# Patient Record
Sex: FEMALE | Race: WHITE | NOT HISPANIC OR LATINO | Employment: FULL TIME | URBAN - METROPOLITAN AREA
[De-identification: names, ages, dates, MRNs, and addresses within clinical notes are randomized per-mention and may not be internally consistent; named-entity substitution may affect disease eponyms.]

---

## 2021-01-11 ENCOUNTER — TRANSFERRED RECORDS (OUTPATIENT)
Dept: HEALTH INFORMATION MANAGEMENT | Facility: CLINIC | Age: 31
End: 2021-01-11

## 2021-01-12 ENCOUNTER — TRANSFERRED RECORDS (OUTPATIENT)
Dept: HEALTH INFORMATION MANAGEMENT | Facility: CLINIC | Age: 31
End: 2021-01-12

## 2021-01-14 ENCOUNTER — TRANSFERRED RECORDS (OUTPATIENT)
Dept: HEALTH INFORMATION MANAGEMENT | Facility: CLINIC | Age: 31
End: 2021-01-14

## 2021-01-26 ENCOUNTER — MEDICAL CORRESPONDENCE (OUTPATIENT)
Dept: HEALTH INFORMATION MANAGEMENT | Facility: CLINIC | Age: 31
End: 2021-01-26

## 2021-01-27 ENCOUNTER — TRANSCRIBE ORDERS (OUTPATIENT)
Dept: OTHER | Age: 31
End: 2021-01-27

## 2021-01-27 DIAGNOSIS — G89.29 BILATERAL CHRONIC KNEE PAIN: Primary | ICD-10-CM

## 2021-01-27 DIAGNOSIS — M25.561 BILATERAL CHRONIC KNEE PAIN: Primary | ICD-10-CM

## 2021-01-27 DIAGNOSIS — M25.562 BILATERAL CHRONIC KNEE PAIN: Primary | ICD-10-CM

## 2021-03-26 DIAGNOSIS — M25.561 PAIN IN BOTH KNEES, UNSPECIFIED CHRONICITY: Primary | ICD-10-CM

## 2021-03-26 DIAGNOSIS — M25.562 PAIN IN BOTH KNEES, UNSPECIFIED CHRONICITY: Primary | ICD-10-CM

## 2021-03-30 ENCOUNTER — PRE VISIT (OUTPATIENT)
Dept: ORTHOPEDICS | Facility: CLINIC | Age: 31
End: 2021-03-30

## 2021-03-30 ENCOUNTER — ANCILLARY PROCEDURE (OUTPATIENT)
Dept: GENERAL RADIOLOGY | Facility: CLINIC | Age: 31
End: 2021-03-30
Attending: ORTHOPAEDIC SURGERY
Payer: OTHER GOVERNMENT

## 2021-03-30 ENCOUNTER — ANCILLARY PROCEDURE (OUTPATIENT)
Dept: CT IMAGING | Facility: CLINIC | Age: 31
End: 2021-03-30
Attending: ORTHOPAEDIC SURGERY
Payer: OTHER GOVERNMENT

## 2021-03-30 ENCOUNTER — THERAPY VISIT (OUTPATIENT)
Dept: PHYSICAL THERAPY | Facility: CLINIC | Age: 31
End: 2021-03-30
Payer: OTHER GOVERNMENT

## 2021-03-30 ENCOUNTER — OFFICE VISIT (OUTPATIENT)
Dept: ORTHOPEDICS | Facility: CLINIC | Age: 31
End: 2021-03-30
Payer: OTHER GOVERNMENT

## 2021-03-30 VITALS — BODY MASS INDEX: 39.68 KG/M2 | WEIGHT: 293 LBS | HEIGHT: 72 IN

## 2021-03-30 DIAGNOSIS — M25.562 BILATERAL CHRONIC KNEE PAIN: ICD-10-CM

## 2021-03-30 DIAGNOSIS — M25.562 PAIN IN BOTH KNEES, UNSPECIFIED CHRONICITY: ICD-10-CM

## 2021-03-30 DIAGNOSIS — M25.561 PAIN IN BOTH KNEES, UNSPECIFIED CHRONICITY: ICD-10-CM

## 2021-03-30 DIAGNOSIS — M25.561 BILATERAL CHRONIC KNEE PAIN: ICD-10-CM

## 2021-03-30 DIAGNOSIS — M25.562 CHRONIC PAIN OF BOTH KNEES: ICD-10-CM

## 2021-03-30 DIAGNOSIS — G89.29 BILATERAL CHRONIC KNEE PAIN: ICD-10-CM

## 2021-03-30 DIAGNOSIS — G89.29 CHRONIC PAIN OF BOTH KNEES: ICD-10-CM

## 2021-03-30 DIAGNOSIS — M25.561 CHRONIC PAIN OF BOTH KNEES: ICD-10-CM

## 2021-03-30 PROCEDURE — 97530 THERAPEUTIC ACTIVITIES: CPT | Mod: GP | Performed by: PHYSICAL THERAPIST

## 2021-03-30 PROCEDURE — 97110 THERAPEUTIC EXERCISES: CPT | Mod: GP | Performed by: PHYSICAL THERAPIST

## 2021-03-30 PROCEDURE — 99203 OFFICE O/P NEW LOW 30 MIN: CPT | Mod: GC | Performed by: ORTHOPAEDIC SURGERY

## 2021-03-30 PROCEDURE — 73560 X-RAY EXAM OF KNEE 1 OR 2: CPT | Mod: XU | Performed by: RADIOLOGY

## 2021-03-30 PROCEDURE — 73700 CT LOWER EXTREMITY W/O DYE: CPT | Mod: RT | Performed by: RADIOLOGY

## 2021-03-30 PROCEDURE — 97161 PT EVAL LOW COMPLEX 20 MIN: CPT | Mod: GP | Performed by: PHYSICAL THERAPIST

## 2021-03-30 PROCEDURE — 77073 BONE LENGTH STUDIES: CPT | Performed by: RADIOLOGY

## 2021-03-30 RX ORDER — LEVOTHYROXINE SODIUM 50 UG/1
50 TABLET ORAL DAILY
COMMUNITY
Start: 2021-03-09 | End: 2022-07-13

## 2021-03-30 ASSESSMENT — KOOS JR: HOW SEVERE IS YOUR KNEE STIFFNESS AFTER FIRST WAKING IN MORNING: MODERATE

## 2021-03-30 ASSESSMENT — MIFFLIN-ST. JEOR: SCORE: 2163.16

## 2021-03-30 NOTE — NURSING NOTE
"Reason For Visit:   Chief Complaint   Patient presents with     Consult     [facility] chronic knee pain/Dr. Tavares       Primary MD: Dr. Tavares  Ref. MD: Dr. Tavares    ?  No  Occupation .  Currently working? Yes.  Work status?  Full time.    Date of injury: No  Type of injury: No.    Date of surgery & Type:  2000's and 2010   Multiple scopes on both knees and a  Right patella derotation     Smoker: No  Request smoking cessation information: No    Ht 1.825 m (5' 11.85\")   Wt 134.9 kg (297 lb 4.8 oz)   BMI 40.49 kg/m      Pain Assessment  Patient Currently in Pain: Yes  0-10 Pain Scale: 5  Primary Pain Location: Knee(bilateral)       Judith LeonardaoABRAMN  "

## 2021-03-30 NOTE — PROGRESS NOTES
"    Department of Orthopedic Surgery  Felecia Edouard MD        PATIENT NAME: Suzy Lobato   MRN: [id number]  AGE: 31 year old  BMI: Body mass index is 40.49 kg/m .  REFERRING PHYSICIAN: Dean Tavares      CHIEF COMPLAINT: Consult ([facility] chronic knee pain/Dr. Tavares)      HISTORY OF PRESENT ILLNESS:  Suzy Lobato is a 31 year old female who presents with bilateral knee pain, right worse than left.  She reports that this pain has been ongoing for almost her entire life.  Her left knee began giving her problems in middle school and her first left knee arthroscopy was performed in the year 2000.  She underwent a subsequent arthroscopy in the mid 2000.  She states that each of the surgeries helped somewhat but not significantly and were not lasting.  Her right knee has undergone arthroscopy as well as a right lateral retinacular release which provided short-term relief but is now worsened.  This last surgery was in 2010.  She has undergone multiple courses of physical therapy in the past with limited benefit.  She did not take any medications for her pain.  She has not tried any braces.  She reports the majority of her pain in the anterior and anteromedial aspects of her knee, both on the left and right.  She has not found anything to significantly improve her pain.  Recently, her right knee has been swelling up more on her and feels \"tight\".    Pertinent negatives:  Patient has no history of DVT or PE. Discussed risk factors.      ALLERGIES: Patient has no known allergies.    MEDICATIONS:     Current Outpatient Medications:      levothyroxine (SYNTHROID/LEVOTHROID) 50 MCG tablet, Take 50 mcg by mouth daily, Disp: , Rfl:   She just began this above medication approximately 2 weeks ago.  She was previously on the medication long-term, but when she immigrated to the [place] she was told she no longer needed it.  Upon stopping the medication, she gained almost 100 pounds.    MEDICAL HISTORY:  She is " "currently undergoing work-up for PCOS.  In addition, she is working with the dietitian to work on her weight.  She has tried multiple diets in the past including ketogenic, Atkins, and calorie counting.  None of these have seemed to help.    She has irregular menses.  currently undergoing a hormonal work-up.   Is back on thyroid medication for the last 2 wks to see if this changes her weight and other factors.    SURGICAL HISTORY:   Multiple prior knee surgeries including 2x left knee arthroscopy, right knee arthroscopy, and lateral retinacular lengthening and \"patellar derotation\"    SOCIAL HISTORY:  Patient lives in [place] with her  and her 3-1/2-year-old son.  She works as an  (office work).  Her  is in the  (as well as her father) and she has lived in many places.  She is [place] borne and moved to the  with her  5 years ago.    Social History     Tobacco Use     Smoking status: Not on file   Substance Use Topics     Alcohol use: Not on file     PHYSICAL EXAMINATION:  On physical examination the patient appears the stated age, is in no acute distress, affect is appropriate, and breathing is non-labored.  BMI: Body mass index is 40.49 kg/m .    Gait: patient walks with a normal gait.     LLE:  No deformity, skin intact.  Prior surgical incision:  well-healed   Overall limb alignment is: Neutral  Effusion or swelling of the knee: None  Tenderness to palpation: Pain with patellar compression.  Pain to medial and lateral joint line.  ROM: 0 to 130  ROM hip: 0 internal rotation, 60 external rotation.  Pain with knee ROM: Yes, anteromedial and medial joint line  Crepitance with knee ROM: Yes, repeatable crepitus near terminal extension  Extensor lag: None  MCL stability: Stable  Lateral Stability: Stable  Lachman: 1A  Posterior stability: stable  Patellar translation: 1 quadrant medial, 1 quadrants lateral   Apprehension: None  Medial patella tilt: None  J-sign: " None     Motor intact distally TA/GSC/EHL/FHL with 5/5 strength. SILT sp/dp/tibial/saph/sural nerves. DP/PT pulses palpable, 2+, toes warm and well perfused.     RLE:     No deformity, skin intact.  Prior surgical incision: Well-healed  Overall limb alignment is: Neutral  Effusion or swelling of the knee: Mild effusion  Tenderness to palpation: Tender to palpation in the anteromedial knee, medial and lateral joint lines, patellar compression  Knee ROM: 5 to 130  Hip ROM: 10 degrees of internal rotation and 70 degrees of external rotation.  Pain with knee ROM: Yes, prominently anterior and anteromedial  Crepitance with knee ROM: No repeatable crepitus  Extensor lag: None  MCL stability: Stable  Lateral Stability: Stable  Lachman: 1A  Posterior stability: stable  Patellar translation: 1 quadrant medial, 2 quadrants lateral  Apprehension: None  Medial patella tilt: None  J-sign: None     Motor intact distally TA/GSC/EHL/FHL with 5/5 strength. SILT sp/dp/tibial/saph/sural nerves. DP/PT pulses palpable, 2+, toes warm and well perfused.      IMAGING:   Knee X-Rays were obtained today and reviewed.     The 20 degree axial views demonstrate slight joint space narrowing on the left compared to the right.  The patellae are sitting within the trochlear groove.    Long-leg standing films were obtained which shows slight genu valgum on the left and neutral limb alignment on the right.    Previously obtained MRI in January 2021 of the bilateral knees demonstrates mainly patellofemoral chondral loss with a moderate effusion on the right knee.  There is no evidence of meniscal tearing, ligamentous changes, or significant bony edema.  No full-thickness cartilage defects on the tibiofemoral joint.    ASSESSMENT/PLAN: Suzy Lobato is a 31 year old female with a history of multiple bilateral knee surgeries who presents to clinic today with bilateral knee pain, right worse than left.     We discussed today that there are a multitude  of factors that could be contributing to the patient's knee pain.  She has chondral wear in her patellofemoral joint and localizes most of her pain to the patellofemoral joint.  However, she has pain in her joint line and tibial femoral symptoms as well.  We discussed that her hip rotation is markedly abnormal with no internal rotation on the left hip and only 10 degrees of rotation on the right hip.  We recommended a CT of her bilateral lower extremities to evaluate for rotational alignment of the femurs and tibias.  We anticipate that rotational malalignment of her limbs could be contributing to her symptoms and placing her at high risk of progressive osteoarthritis.    We also discussed her weight today and how this contributes to her pain.  She has struggled with her weight [place] and not getting her medication for levothyroxine approved.  She gained over 100 pounds with the loss of this medication and change in lifestyle.  In addition, she had a child approximately 3-1/2 years ago and is much heavier now than she was prior to her pregnancy.  She has tried multiple diets as well as activity modifications and has had little success.  She has worked with a dietitian and would be interested in bariatric surgery, however this was previously denied as she does not have a high enough BMI for bariatric surgery consideration with her current insurance.  She is also undergoing work-up for PCOS with her OB/GYN, and is hopeful that if she can get this under control this may be of assistance as well.    Plan:  -CT scan of bilateral lower extremities to evaluate rotational malalignment  -Physical therapy for strengthening and range of motion of bilateral lower extremities.  This can be completed closer to home in [place].  -Follow-up in 1 week with a virtual visit/telephone call for results of CT.  If the CT shows significant rotational abnormalities, would consider follow-up with one of  our hip surgeons or lower extremity deformity surgeons.    The patient was seen and discussed with Dr. Edouard.    RT:30 min, CT: 25 min    Freddy Jack MD  Orthopaedic Surgery PGY-4    I have personally examined this patient and have reviewed the clinical presentation and progress note with the resident.  I agree with the treatment plan as outlined.  The plan was formulated with the resident on the day of the resident dictation.    Felecia Edouard MD    Addendum:   Right femoral RETROVERSION is 5 degrees.  Left femoral RETROVERSION is 11 degrees.     Tibial Torsion:  Tibial torsion on the right is 44 degrees.  Tibial torsion on the left is 47 degrees.     Tibial Tuberosity to Trochlear groove distance:  Right:1mm  Left: 7 mm.    The results of the CT scan were discussed with the patient by phone.  I am recommending that she consider proceeding with a tibial derotation osteotomy on her right side which is her most affected side.  I believe that her particular constellation of symptoms are interesting in that because her hips are so retroverted and so stiff to range of motion, she would have a particularly hard time internally rotating her foot to get her toes pointing straight forward.  This is why she has such a hard time with walking as she does not like the feeling of walking with her feet externally rotated into regards to balance and leg pain but she literally has a hard time pointing her toes straightforward.    She understands that I do not perform the surgery at this point time and have recommended Dr. Madrigal as he has more experience with tibia fractures rodding and lower extremity conditions.  Because she lives in [place] and travels far, she will expect a phone call discussion for virtual visit with Dr. Madrigal.    All questions were answered    Felecia Edouard MD  Professor Orthopedic Surgery  [facility]

## 2021-03-30 NOTE — LETTER
"    3/30/2021         RE: Suzy Lobato  [address]  [place] [address]        Dear Colleague,    Thank you for referring your patient, Suzy Lobato, to the [facility] [place]. Please see a copy of my visit note below.        Department of Orthopedic Surgery  Felecia Edouard MD        PATIENT NAME: Suzy Lobato   MRN: [id number]  AGE: 31 year old  BMI: Body mass index is 40.49 kg/m .  REFERRING PHYSICIAN: Dean Tavares      CHIEF COMPLAINT: Consult ([facility] chronic knee pain/Dr. Tavares)      HISTORY OF PRESENT ILLNESS:  Suzy Lobato is a 31 year old female who presents with bilateral knee pain, right worse than left.  She reports that this pain has been ongoing for almost her entire life.  Her left knee began giving her problems in middle school and her first left knee arthroscopy was performed in the year 2000.  She underwent a subsequent arthroscopy in the mid 2000.  She states that each of the surgeries helped somewhat but not significantly and were not lasting.  Her right knee has undergone arthroscopy as well as a right lateral retinacular release which provided short-term relief but is now worsened.  This last surgery was in 2010.  She has undergone multiple courses of physical therapy in the past with limited benefit.  She did not take any medications for her pain.  She has not tried any braces.  She reports the majority of her pain in the anterior and anteromedial aspects of her knee, both on the left and right.  She has not found anything to significantly improve her pain.  Recently, her right knee has been swelling up more on her and feels \"tight\".    Pertinent negatives:  Patient has no history of DVT or PE. Discussed risk factors.      ALLERGIES: Patient has no known allergies.    MEDICATIONS:     Current Outpatient Medications:      levothyroxine (SYNTHROID/LEVOTHROID) 50 MCG tablet, Take 50 mcg by mouth daily, Disp: , Rfl:   She just began this above " "medication approximately 2 weeks ago.  She was previously on the medication long-term, but when she immigrated to the [place] she was told she no longer needed it.  Upon stopping the medication, she gained almost 100 pounds.    MEDICAL HISTORY:  She is currently undergoing work-up for PCOS.  In addition, she is working with the dietitian to work on her weight.  She has tried multiple diets in the past including ketogenic, Atkins, and calorie counting.  None of these have seemed to help.    She has irregular menses.  currently undergoing a hormonal work-up.   Is back on thyroid medication for the last 2 wks to see if this changes her weight and other factors.    SURGICAL HISTORY:   Multiple prior knee surgeries including 2x left knee arthroscopy, right knee arthroscopy, and lateral retinacular lengthening and \"patellar derotation\"    SOCIAL HISTORY:  Patient lives in [place] with her  and her 3-1/2-year-old son.  She works as an  (office work).  Her  is in the  (as well as her father) and she has lived in many places.  She is [place] borne and moved to the  with her  5 years ago.    Social History     Tobacco Use     Smoking status: Not on file   Substance Use Topics     Alcohol use: Not on file         PHYSICAL EXAMINATION:  On physical examination the patient appears the stated age, is in no acute distress, affect is appropriate, and breathing is non-labored.  BMI: Body mass index is 40.49 kg/m .    Gait: patient walks with a normal gait.     LLE:  No deformity, skin intact.  Prior surgical incision:  well-healed   Overall limb alignment is: Neutral  Effusion or swelling of the knee: None  Tenderness to palpation: Pain with patellar compression.  Pain to medial and lateral joint line.  ROM: 0 to 130  ROM hip: 0 internal rotation, 60 external rotation.  Pain with knee ROM: Yes, anteromedial and medial joint line  Crepitance with knee ROM: Yes, repeatable " crepitus near terminal extension  Extensor lag: None  MCL stability: Stable  Lateral Stability: Stable  Lachman: 1A  Posterior stability: stable  Patellar translation: 1 quadrant medial, 1 quadrants lateral   Apprehension: None  Medial patella tilt: None  J-sign: None     Motor intact distally TA/GSC/EHL/FHL with 5/5 strength. SILT sp/dp/tibial/saph/sural nerves. DP/PT pulses palpable, 2+, toes warm and well perfused.     RLE:     No deformity, skin intact.  Prior surgical incision: Well-healed  Overall limb alignment is: Neutral  Effusion or swelling of the knee: Mild effusion  Tenderness to palpation: Tender to palpation in the anteromedial knee, medial and lateral joint lines, patellar compression  Knee ROM: 5 to 130  Hip ROM: 10 degrees of internal rotation and 70 degrees of external rotation.  Pain with knee ROM: Yes, prominently anterior and anteromedial  Crepitance with knee ROM: No repeatable crepitus  Extensor lag: None  MCL stability: Stable  Lateral Stability: Stable  Lachman: 1A  Posterior stability: stable  Patellar translation: 1 quadrant medial, 2 quadrants lateral  Apprehension: None  Medial patella tilt: None  J-sign: None     Motor intact distally TA/GSC/EHL/FHL with 5/5 strength. SILT sp/dp/tibial/saph/sural nerves. DP/PT pulses palpable, 2+, toes warm and well perfused.      IMAGING:   Knee X-Rays were obtained today and reviewed.     The 20 degree axial views demonstrate slight joint space narrowing on the left compared to the right.  The patellae are sitting within the trochlear groove.    Long-leg standing films were obtained which shows slight genu valgum on the left and neutral limb alignment on the right.    Previously obtained MRI in January 2021 of the bilateral knees demonstrates mainly patellofemoral chondral loss with a moderate effusion on the right knee.  There is no evidence of meniscal tearing, ligamentous changes, or significant bony edema.  No full-thickness cartilage defects on  the tibiofemoral joint.    ASSESSMENT/PLAN: Suzy Lobato is a 31 year old female with a history of multiple bilateral knee surgeries who presents to clinic today with bilateral knee pain, right worse than left.     We discussed today that there are a multitude of factors that could be contributing to the patient's knee pain.  She has chondral wear in her patellofemoral joint and localizes most of her pain to the patellofemoral joint.  However, she has pain in her joint line and tibial femoral symptoms as well.  We discussed that her hip rotation is markedly abnormal with no internal rotation on the left hip and only 10 degrees of rotation on the right hip.  We recommended a CT of her bilateral lower extremities to evaluate for rotational alignment of the femurs and tibias.  We anticipate that rotational malalignment of her limbs could be contributing to her symptoms and placing her at high risk of progressive osteoarthritis.    We also discussed her weight today and how this contributes to her pain.  She has struggled with her weight since coming [place] ago and not getting her medication for levothyroxine approved.  She gained over 100 pounds with the loss of this medication and change in lifestyle.  In addition, she had a child approximately 3-1/2 years ago and is much heavier now than she was prior to her pregnancy.  She has tried multiple diets as well as activity modifications and has had little success.  She has worked with a dietitian and would be interested in bariatric surgery, however this was previously denied as she does not have a high enough BMI for bariatric surgery consideration with her current insurance.  She is also undergoing work-up for PCOS with her OB/GYN, and is hopeful that if she can get this under control this may be of assistance as well.    Plan:  -CT scan of bilateral lower extremities to evaluate rotational malalignment  -Physical therapy for strengthening and range  of motion of bilateral lower extremities.  This can be completed closer to home in [place].  -Follow-up in 1 week with a virtual visit/telephone call for results of CT.  If the CT shows significant rotational abnormalities, would consider follow-up with one of our hip surgeons or lower extremity deformity surgeons.    The patient was seen and discussed with Dr. Edouard.    RT:30 min, CT: 25 min    Freddy Jack MD  Orthopaedic Surgery PGY-4    I have personally examined this patient and have reviewed the clinical presentation and progress note with the resident.  I agree with the treatment plan as outlined.  The plan was formulated with the resident on the day of the resident dictation.    Felecia Edouard MD

## 2021-03-30 NOTE — PROGRESS NOTES
PHYSICAL THERAPIST IMPRESSION: Suzy presents to physical therapy with bilateral (left > right) knee pain. She has been participating in physical therapy in [place] and her PT recommended her to see Dr. Edouard after the surgeon locally had nothing to offer her. She does have findings consistent with both tibiofemoral and patellafemoral involvement. She has an effusion on her right knee, none on her left. She is lacking flexion ROM on the right but not on the left. She has decreased core and proximal hip strength but is working on it in PT. She did not have significant improvement with the tape techniques (trialed L>M glide, L>M glide + tilt, M>L glide). She was instructed in tape techniques to try with PT routine and assess response. She was encouraged to continue to participate in PT.     May be a candidate for  brace? Wouldn't anticipate a PF brace to have much effect. Has tried BFR.     RESPONSIVENESS TO STUART TAPE:  Stuart Taping Trial    Pre/posttest activity DL squatting   Taping technique(s) trialed L>M glide  L>M glide + tilt  L>M glide + LEE  M>L glide   Numerical Pain Scale 5/10 to 4/10 (BOTH) Glide  +Tilt (worse on right, same on left).   No change with LEE or M>L technique   Level of reported stability (0/10= full confidence in knee; 10/10=no confidence in knee) No change   Improvement in movement pattern with tape on Smoother mobility with a M>L glide.         PT MODIFIABLE FACTORS PRESENT NOT PRESENT   Proximal LE/Trunk mm weakness X    Quadriceps muscle dysfunction/weakness X    Poor postural stability X    Poor dynamic movement patterns X    Restricted ankle DF  X   Previous PF PT Interventions X (Currently in PT)      PATIENT BONY ALIGNMENT SUSPICIOUS FOR: (to be determined by MD and imaging studies):    Suspect Genu valgum on left, varum on right       Physical Therapy Initial Evaluation: Subjective History     Injury/Condition Details:  Presenting Complaint Left > right knee  pain   Onset Timing/Date Years.    Mechanism Played a lot of basketball growing up and always was in pain.   10 years old did knee surgery -a scope.   Constant - fluctuates based on activity.   Has more scopes, clean ups.   11 years ago had a lateralization procedure on the RIGHT knee. That was helpful but not super beneficial.      Symptom Behavior Details    Primary Symptoms Constant symptoms; worsen with activity, pain (Location: Medial inferior knee, medial and lateral joint, Quality: Aching/Throbbing), stiffness, catching/locking, weakness, swelling often (gets puffy), buckling/shifting/giving way   Worst Pain 7/10 (with change in weather)   Symptom Provocators Being inactive  Being too active  Can do stairs (more mental challenge than knee pain)  Sitting is worse than standing.    Best Pain 2-3/10    Symptom Relievers The right amount of activities   Ice  Compression  Heat  Does not take meds   Time of day dependent? Worse in evening after activity   Recent symptom change? symptoms worsening     Prior Testing/Intervention for current condition:  Prior Tests  x-ray and MRI   Prior Treatment PT , Brace, Patellar taping: Stretchy and Surgery(ies): See above/note  Injections (steroid, synvisc)     Lifestyle & General Medical History:  Employment [employer]   Usual physical activities  (within past year) Goal: go to the gym, a work-out.   Does elliptical and TM, 1/2 mile limit.    Orthopaedic history See Epic Chart   Notable medical history See Epic Chart   Patient Reported Health good       Lower Extremity Exam    Dynamic Movement Screen:  2 leg stance: Questionable alignment findings, equal weight bearing  2 leg squat:Reduced squat depth d/t reported pain at knee    1 leg stance:   Right: proprioceptive challenge  Left: proprioceptive challenge    Gait: Antalgic, altered gait pattern, questionable rotational alignment components    Patellofemoral Joint Examination:  Test Right  Left   Patellar Orientation:   90 deg flexion neutral neutral   OKC Patellar Tracking Normal Normal   Patellar Orientation:  0 deg flexion mild lateral translation and patella leesa visually observed mild lateral translation and patella leesa visually observed   Quadrant Mobility (Med:Lat) 2:2  2:2   Effusion 0 0   Quad Activation Good Good     Knee Joint AROM: grossly symmetrical, pain at end range flexion (right > left)    Palpation:   Tender to palpation at the following structures: medial patellar border, lateral patellar border, medial joint line and lateral joint line    Hip Joint PROM Screen   Right Left Difference   Flex 90 deg 90 deg 0 deg   ER 70 deg 70 deg 0 deg   IR 10 deg 10 deg 0 deg     Lower Extremity Muscle Strength (x/5)   Right Left   Hip ER 5-/5 5-/5   Hip IR 5-/5 5-/5   Hip ABD 5-/5 5-/5   Hip ADD NT/5 NT/5   Hip Ext 5-/5 5-/5   Knee Flex 5-/5 5-/5     Lower Extremity Flexibility Screen:   Right Left   Hamstring - -   HECTOR - -   Goalie Stretch - -   Gastroc/ Soleus - -   - = normal  + = mild tightness  ++ = moderate tightness  +++ = significant tightness      Assessment/Plan:  Patient is a 31 year old female with both sides knee complaints.    Patient has the following significant findings with corresponding treatment plan.                Diagnosis 1:  Bilateral knee pain  Pain -  hot/cold therapy, manual therapy, STS, splint/taping/bracing/orthotics, self management and education  Decreased ROM/flexibility - manual therapy, therapeutic exercise and home program  Decreased strength - therapeutic exercise, therapeutic activities and home program  Impaired balance - neuro re-education, therapeutic activities and home program  Decreased function - therapeutic activities and home program    Therapy Evaluation Codes:   1) History comprised of:   Personal factors that impact the plan of care:      None.    Comorbidity factors that impact the plan of care are:      None.     Medications impacting care:  None.  2) Examination of Body Systems comprised of:   Body structures and functions that impact the plan of care:      Knee.   Activity limitations that impact the plan of care are:      Lifting, Sitting, Squatting/kneeling and Standing.  3) Clinical presentation characteristics are:   Stable/Uncomplicated.  4) Decision-Making    Low complexity using standardized patient assessment instrument and/or measureable assessment of functional outcome.  Cumulative Therapy Evaluation is: Low complexity.    Previous and current functional limitations:  (See Goal Flow Sheet for this information)    Short term and Long term goals: (See Goal Flow Sheet for this information)     Communication ability:  Patient appears to be able to clearly communicate and understand verbal and written communication and follow directions correctly.  Treatment Explanation - The following has been discussed with the patient:   RX ordered/plan of care  Anticipated outcomes  Possible risks and side effects  This patient would benefit from PT intervention to resume normal activities.   Rehab potential is good.    Frequency:  1 X week, once daily  Duration:  for 1 weeks  Discharge Plan:  Achieve all LTG.  Independent in home treatment program.  Reach maximal therapeutic benefit.    Please refer to the daily flowsheet for treatment today, total treatment time and time spent performing 1:1 timed codes.

## 2021-03-31 PROBLEM — M25.562 BILATERAL KNEE PAIN: Status: ACTIVE | Noted: 2021-03-31

## 2021-03-31 PROBLEM — M25.561 BILATERAL KNEE PAIN: Status: ACTIVE | Noted: 2021-03-31

## 2021-03-31 PROBLEM — M25.561 BILATERAL KNEE PAIN: Status: RESOLVED | Noted: 2021-03-31 | Resolved: 2021-03-31

## 2021-03-31 PROBLEM — M25.562 BILATERAL KNEE PAIN: Status: RESOLVED | Noted: 2021-03-31 | Resolved: 2021-03-31

## 2021-04-09 NOTE — TELEPHONE ENCOUNTER
DIAGNOSIS: discuss derotations osteotomies bilateral    APPOINTMENT DATE: 4/20/2021   NOTES STATUS DETAILS   OFFICE NOTE from referring provider     OFFICE NOTE from other specialist Internal Physical Therapy and Ortho Notes in [facility]    DISCHARGE SUMMARY from hospital N/A    DISCHARGE REPORT from the ER N/A    OPERATIVE REPORT N/A    MEDICATION LIST Internal    EMG (for Spine) N/A    IMPLANT RECORD/STICKER N/A    LABS     CBC/DIFF internal [facility] - last updated on 1/4/2021   CULTURES N/A    INJECTIONS DONE IN RADIOLOGY N/A    MRI Internal MRI Knee ([place])    CT SCAN internal CT Tibia Fibula Lower Leg 3/30/2021   XRAYS (IMAGES & REPORTS) Internal Xray Knee Bilateral 3/30/2021    Xray Six Foot Standing 3/30/2021    Xray Knee ([place])    TUMOR     PATHOLOGY  Slides & report N/A

## 2021-04-20 ENCOUNTER — VIRTUAL VISIT (OUTPATIENT)
Dept: ORTHOPEDICS | Facility: CLINIC | Age: 31
End: 2021-04-20
Payer: OTHER GOVERNMENT

## 2021-04-20 ENCOUNTER — PRE VISIT (OUTPATIENT)
Dept: ORTHOPEDICS | Facility: CLINIC | Age: 31
End: 2021-04-20

## 2021-04-20 DIAGNOSIS — M25.561 ARTHRALGIA OF RIGHT KNEE: Primary | ICD-10-CM

## 2021-04-20 PROCEDURE — 99443 PR PHYSICIAN TELEPHONE EVALUATION 21-30 MIN: CPT | Mod: TEL | Performed by: ORTHOPAEDIC SURGERY

## 2021-04-20 NOTE — PROGRESS NOTES
CHIEF COMPLAINT:  Right increased tibia torsion.      HISTORY OF PRESENT ILLNESS:  Mrs. Lobato was interviewed via phone with regards to the right lower leg.  The patient authorized the encounter.        Mrs. Lobato has been evaluated by Dr. Edouard with regards to the knee pain and the tibia torsion.  A CT scan has been obtained which was significant for showing a tibia torsion on the right of 44 degrees and 47 degrees for the left tibia.  The patient has been evaluated and prior discussed with Dr. Edouard and it was concluded that she would benefit from undergoing right tibia and fibula derotational osteotomies.      On today's interview, we discussed the most likely postoperative course and complications from undergoing such intervention, which include but are not limited to infection, bleeding, nerve damage, residual pain, nonunion and stiffness.      All questions were answered.  The patient was pleased with the discussion.  The patient will proceed with surgery at the best of her convenience.  In the meantime, she has no activity restrictions.      TT 30 minutes, CT 20 minutes.

## 2021-04-20 NOTE — LETTER
4/20/2021         RE: Suzy Lobato  [address]  [place] [address]        Dear Colleague,    Thank you for referring your patient, Suzy Lobato, to the [facility] [place]. Please see a copy of my visit note below.    CHIEF COMPLAINT:  Right increased tibia torsion.      HISTORY OF PRESENT ILLNESS:  Mrs. Lobato was interviewed via phone with regards to the right lower leg.  The patient authorized the encounter.        Mrs. Lobato has been evaluated by Dr. Edouard with regards to the knee pain and the tibia torsion.  A CT scan has been obtained which was significant for showing a tibia torsion on the right of 44 degrees and 47 degrees for the left tibia.  The patient has been evaluated and prior discussed with Dr. Edouard and it was concluded that she would benefit from undergoing right tibia and fibula derotational osteotomies.      On today's interview, we discussed the most likely postoperative course and complications from undergoing such intervention, which include but are not limited to infection, bleeding, nerve damage, residual pain, nonunion and stiffness.      All questions were answered.  The patient was pleased with the discussion.  The patient will proceed with surgery at the best of her convenience.  In the meantime, she has no activity restrictions.      TT 30 minutes, CT 20 minutes.         Ronnie Castle MD

## 2021-04-21 ENCOUNTER — TELEPHONE (OUTPATIENT)
Dept: ORTHOPEDICS | Facility: CLINIC | Age: 31
End: 2021-04-21

## 2021-04-21 NOTE — TELEPHONE ENCOUNTER
Phoned patient to schedule surgery with Dr Castle. I left her my direct number to call back at her convenience. [phone]

## 2021-04-23 ENCOUNTER — PREP FOR PROCEDURE (OUTPATIENT)
Dept: ORTHOPEDICS | Facility: CLINIC | Age: 31
End: 2021-04-23

## 2021-04-23 DIAGNOSIS — M21.861 TIBIAL TORSION, RIGHT: Primary | ICD-10-CM

## 2021-04-23 NOTE — TELEPHONE ENCOUNTER
Patient is scheduled for surgery with Dr. Castle    Spoke with: Spoke with Suzy    Date of Surgery: 5/26/21    Location: [facility]    Informed patient they will need an adult  Yes    Pre-op with surgeon (if applicable): Complete    H&P: Patient to schedule with PCP    Pre-procedure COVID-19 Test: Patient will await call from covid scheduling team    Additional imaging/appointments: n/a    Surgery packet: Mailed 4/23/21     Additional comments: Patient will await call from pre op nurses 1-2 days prior to surgery for arrival time

## 2021-04-26 ENCOUNTER — TELEPHONE (OUTPATIENT)
Dept: ORTHOPEDICS | Facility: CLINIC | Age: 31
End: 2021-04-26

## 2021-04-26 PROBLEM — M21.861 TIBIAL TORSION, RIGHT: Status: ACTIVE | Noted: 2021-04-26

## 2021-04-26 NOTE — TELEPHONE ENCOUNTER
Suzy was phoned by RN and voicemail was left that the physical therapy protocol for tibia/fibula osteotomy is being mailed to her home.  Instructions about preop H&P, COVID test, NPO and reviewing preop packet were also left, along with our clinic number to call back if she has questions.  Rosetta Celeste RN

## 2021-04-29 DIAGNOSIS — M21.861 TIBIAL TORSION, RIGHT: Primary | ICD-10-CM

## 2021-05-01 ENCOUNTER — HEALTH MAINTENANCE LETTER (OUTPATIENT)
Age: 31
End: 2021-05-01

## 2021-05-05 ENCOUNTER — TELEPHONE (OUTPATIENT)
Dept: ORTHOPEDICS | Facility: CLINIC | Age: 31
End: 2021-05-05

## 2021-05-05 NOTE — TELEPHONE ENCOUNTER
2:15PM  Pt phoned back and we reviewed preop preparations.  Pt has preop next week with her local provider.  Pt lives in [place] and asked about COVID test locally, and due to limitations in when her clinic offers the test, and how we need the results in time, she stated she would drive to the [facility] for the swab.    Rosetta Celeste RN        2:00PM  Suzy is planning Right tib/fib osteotomies with Dr Castle on 5/26/21 at [place].  Voicemail was left by RN regarding a call back so we can review the preop packet and preparations.  Preop pkt was mailed to pt two weeks ago.  Rosetta Celeste RN

## 2021-05-12 DIAGNOSIS — Z11.59 ENCOUNTER FOR SCREENING FOR OTHER VIRAL DISEASES: ICD-10-CM

## 2021-05-25 ENCOUNTER — ANESTHESIA EVENT (OUTPATIENT)
Dept: SURGERY | Facility: CLINIC | Age: 31
End: 2021-05-25
Payer: OTHER GOVERNMENT

## 2021-05-26 ENCOUNTER — HOSPITAL ENCOUNTER (OUTPATIENT)
Facility: CLINIC | Age: 31
Discharge: HOME | End: 2021-05-26
Attending: ORTHOPAEDIC SURGERY | Admitting: ORTHOPAEDIC SURGERY
Payer: OTHER GOVERNMENT

## 2021-05-26 ENCOUNTER — APPOINTMENT (OUTPATIENT)
Dept: GENERAL RADIOLOGY | Facility: CLINIC | Age: 31
End: 2021-05-26
Attending: ORTHOPAEDIC SURGERY
Payer: OTHER GOVERNMENT

## 2021-05-26 ENCOUNTER — ANESTHESIA (OUTPATIENT)
Dept: SURGERY | Facility: CLINIC | Age: 31
End: 2021-05-26
Payer: OTHER GOVERNMENT

## 2021-05-26 VITALS
SYSTOLIC BLOOD PRESSURE: 129 MMHG | WEIGHT: 293 LBS | DIASTOLIC BLOOD PRESSURE: 74 MMHG | OXYGEN SATURATION: 97 % | RESPIRATION RATE: 14 BRPM | HEART RATE: 84 BPM | BODY MASS INDEX: 39.68 KG/M2 | HEIGHT: 72 IN | TEMPERATURE: 98.1 F

## 2021-05-26 DIAGNOSIS — M21.861 TIBIAL TORSION, RIGHT: ICD-10-CM

## 2021-05-26 DIAGNOSIS — R11.0 NAUSEA: ICD-10-CM

## 2021-05-26 DIAGNOSIS — M79.604 PAIN OF RIGHT LOWER EXTREMITY: Primary | ICD-10-CM

## 2021-05-26 LAB
GLUCOSE BLDC GLUCOMTR-MCNC: 117 MG/DL (ref 70–99)
HCG UR QL: NEGATIVE

## 2021-05-26 PROCEDURE — 250N000011 HC RX IP 250 OP 636: Performed by: ORTHOPAEDIC SURGERY

## 2021-05-26 PROCEDURE — 82962 GLUCOSE BLOOD TEST: CPT

## 2021-05-26 PROCEDURE — 710N000012 HC RECOVERY PHASE 2, PER MINUTE: Performed by: ORTHOPAEDIC SURGERY

## 2021-05-26 PROCEDURE — 250N000013 HC RX MED GY IP 250 OP 250 PS 637: Performed by: PHYSICIAN ASSISTANT

## 2021-05-26 PROCEDURE — 250N000025 HC SEVOFLURANE, PER MIN: Performed by: ORTHOPAEDIC SURGERY

## 2021-05-26 PROCEDURE — 250N000011 HC RX IP 250 OP 636: Performed by: STUDENT IN AN ORGANIZED HEALTH CARE EDUCATION/TRAINING PROGRAM

## 2021-05-26 PROCEDURE — 258N000003 HC RX IP 258 OP 636: Performed by: STUDENT IN AN ORGANIZED HEALTH CARE EDUCATION/TRAINING PROGRAM

## 2021-05-26 PROCEDURE — 258N000003 HC RX IP 258 OP 636: Performed by: NURSE ANESTHETIST, CERTIFIED REGISTERED

## 2021-05-26 PROCEDURE — C1713 ANCHOR/SCREW BN/BN,TIS/BN: HCPCS | Performed by: ORTHOPAEDIC SURGERY

## 2021-05-26 PROCEDURE — 250N000009 HC RX 250: Performed by: STUDENT IN AN ORGANIZED HEALTH CARE EDUCATION/TRAINING PROGRAM

## 2021-05-26 PROCEDURE — 271N000001 HC OR GENERAL SUPPLY NON-STERILE: Performed by: ORTHOPAEDIC SURGERY

## 2021-05-26 PROCEDURE — 999N000141 HC STATISTIC PRE-PROCEDURE NURSING ASSESSMENT: Performed by: ORTHOPAEDIC SURGERY

## 2021-05-26 PROCEDURE — 250N000009 HC RX 250: Performed by: NURSE ANESTHETIST, CERTIFIED REGISTERED

## 2021-05-26 PROCEDURE — 710N000010 HC RECOVERY PHASE 1, LEVEL 2, PER MIN: Performed by: ORTHOPAEDIC SURGERY

## 2021-05-26 PROCEDURE — 272N000002 HC OR SUPPLY OTHER OPNP: Performed by: ORTHOPAEDIC SURGERY

## 2021-05-26 PROCEDURE — 272N000001 HC OR GENERAL SUPPLY STERILE: Performed by: ORTHOPAEDIC SURGERY

## 2021-05-26 PROCEDURE — 250N000009 HC RX 250: Performed by: ORTHOPAEDIC SURGERY

## 2021-05-26 PROCEDURE — 250N000013 HC RX MED GY IP 250 OP 250 PS 637: Performed by: STUDENT IN AN ORGANIZED HEALTH CARE EDUCATION/TRAINING PROGRAM

## 2021-05-26 PROCEDURE — 999N000180 XR SURGERY CARM FLUORO LESS THAN 5 MIN: Mod: TC

## 2021-05-26 PROCEDURE — 360N000084 HC SURGERY LEVEL 4 W/ FLUORO, PER MIN: Performed by: ORTHOPAEDIC SURGERY

## 2021-05-26 PROCEDURE — 370N000017 HC ANESTHESIA TECHNICAL FEE, PER MIN: Performed by: ORTHOPAEDIC SURGERY

## 2021-05-26 PROCEDURE — 81025 URINE PREGNANCY TEST: CPT | Performed by: STUDENT IN AN ORGANIZED HEALTH CARE EDUCATION/TRAINING PROGRAM

## 2021-05-26 PROCEDURE — 250N000011 HC RX IP 250 OP 636: Performed by: NURSE ANESTHETIST, CERTIFIED REGISTERED

## 2021-05-26 PROCEDURE — 250N000011 HC RX IP 250 OP 636: Performed by: PHYSICIAN ASSISTANT

## 2021-05-26 DEVICE — IMPLANTABLE DEVICE
Type: IMPLANTABLE DEVICE | Site: LEG | Status: FUNCTIONAL
Brand: NATURAL NAIL®

## 2021-05-26 RX ORDER — GABAPENTIN 300 MG/1
300 CAPSULE ORAL AT BEDTIME
Qty: 21 CAPSULE | Refills: 1 | OUTPATIENT
Start: 2021-05-26 | End: 2022-07-13

## 2021-05-26 RX ORDER — LIDOCAINE HYDROCHLORIDE 20 MG/ML
INJECTION, SOLUTION INFILTRATION; PERINEURAL PRN
Status: DISCONTINUED | OUTPATIENT
Start: 2021-05-26 | End: 2021-05-26

## 2021-05-26 RX ORDER — SODIUM CHLORIDE, SODIUM LACTATE, POTASSIUM CHLORIDE, CALCIUM CHLORIDE 600; 310; 30; 20 MG/100ML; MG/100ML; MG/100ML; MG/100ML
INJECTION, SOLUTION INTRAVENOUS CONTINUOUS PRN
Status: DISCONTINUED | OUTPATIENT
Start: 2021-05-26 | End: 2021-05-26

## 2021-05-26 RX ORDER — DEXAMETHASONE SODIUM PHOSPHATE 4 MG/ML
INJECTION, SOLUTION INTRA-ARTICULAR; INTRALESIONAL; INTRAMUSCULAR; INTRAVENOUS; SOFT TISSUE PRN
Status: DISCONTINUED | OUTPATIENT
Start: 2021-05-26 | End: 2021-05-26

## 2021-05-26 RX ORDER — ONDANSETRON 4 MG/1
4 TABLET, ORALLY DISINTEGRATING ORAL EVERY 8 HOURS PRN
Qty: 20 TABLET | Refills: 0 | Status: SHIPPED | OUTPATIENT
Start: 2021-05-26 | End: 2022-07-13

## 2021-05-26 RX ORDER — OXYCODONE HYDROCHLORIDE 5 MG/1
5 TABLET ORAL
Status: COMPLETED | OUTPATIENT
Start: 2021-05-26 | End: 2021-05-26

## 2021-05-26 RX ORDER — NALOXONE HYDROCHLORIDE 0.4 MG/ML
0.4 INJECTION, SOLUTION INTRAMUSCULAR; INTRAVENOUS; SUBCUTANEOUS
Status: DISCONTINUED | OUTPATIENT
Start: 2021-05-26 | End: 2021-05-26 | Stop reason: HOSPADM

## 2021-05-26 RX ORDER — ONDANSETRON 2 MG/ML
4 INJECTION INTRAMUSCULAR; INTRAVENOUS EVERY 30 MIN PRN
Status: DISCONTINUED | OUTPATIENT
Start: 2021-05-26 | End: 2021-05-26 | Stop reason: HOSPADM

## 2021-05-26 RX ORDER — NALOXONE HYDROCHLORIDE 0.4 MG/ML
0.2 INJECTION, SOLUTION INTRAMUSCULAR; INTRAVENOUS; SUBCUTANEOUS
Status: DISCONTINUED | OUTPATIENT
Start: 2021-05-26 | End: 2021-05-26 | Stop reason: HOSPADM

## 2021-05-26 RX ORDER — FENTANYL CITRATE 50 UG/ML
25-50 INJECTION, SOLUTION INTRAMUSCULAR; INTRAVENOUS
Status: DISCONTINUED | OUTPATIENT
Start: 2021-05-26 | End: 2021-05-26 | Stop reason: HOSPADM

## 2021-05-26 RX ORDER — ONDANSETRON 4 MG/1
4 TABLET, ORALLY DISINTEGRATING ORAL EVERY 30 MIN PRN
Status: DISCONTINUED | OUTPATIENT
Start: 2021-05-26 | End: 2021-05-26 | Stop reason: HOSPADM

## 2021-05-26 RX ORDER — PROPOFOL 10 MG/ML
INJECTION, EMULSION INTRAVENOUS CONTINUOUS PRN
Status: DISCONTINUED | OUTPATIENT
Start: 2021-05-26 | End: 2021-05-26

## 2021-05-26 RX ORDER — OXYCODONE HYDROCHLORIDE 5 MG/1
5-10 TABLET ORAL EVERY 4 HOURS PRN
Qty: 40 TABLET | Refills: 0 | Status: SHIPPED | OUTPATIENT
Start: 2021-05-26 | End: 2021-06-04

## 2021-05-26 RX ORDER — MAGNESIUM HYDROXIDE 1200 MG/15ML
LIQUID ORAL PRN
Status: DISCONTINUED | OUTPATIENT
Start: 2021-05-26 | End: 2021-05-26 | Stop reason: HOSPADM

## 2021-05-26 RX ORDER — ACETAMINOPHEN 325 MG/1
650 TABLET ORAL
Status: DISCONTINUED | OUTPATIENT
Start: 2021-05-26 | End: 2021-05-26 | Stop reason: HOSPADM

## 2021-05-26 RX ORDER — HYDROXYZINE HYDROCHLORIDE 25 MG/1
25 TABLET, FILM COATED ORAL
Status: DISCONTINUED | OUTPATIENT
Start: 2021-05-26 | End: 2021-05-26 | Stop reason: HOSPADM

## 2021-05-26 RX ORDER — OXYCODONE HYDROCHLORIDE 5 MG/1
5 TABLET ORAL EVERY 4 HOURS PRN
Status: DISCONTINUED | OUTPATIENT
Start: 2021-05-26 | End: 2021-05-26 | Stop reason: HOSPADM

## 2021-05-26 RX ORDER — CEFAZOLIN SODIUM IN 0.9 % NACL 3 G/100 ML
3 INTRAVENOUS SOLUTION, PIGGYBACK (ML) INTRAVENOUS
Status: COMPLETED | OUTPATIENT
Start: 2021-05-26 | End: 2021-05-26

## 2021-05-26 RX ORDER — GABAPENTIN 100 MG/1
300 CAPSULE ORAL ONCE
Status: COMPLETED | OUTPATIENT
Start: 2021-05-26 | End: 2021-05-26

## 2021-05-26 RX ORDER — HYDROMORPHONE HYDROCHLORIDE 1 MG/ML
.2-.4 INJECTION, SOLUTION INTRAMUSCULAR; INTRAVENOUS; SUBCUTANEOUS EVERY 10 MIN PRN
Status: DISCONTINUED | OUTPATIENT
Start: 2021-05-26 | End: 2021-05-26 | Stop reason: HOSPADM

## 2021-05-26 RX ORDER — ONDANSETRON 2 MG/ML
INJECTION INTRAMUSCULAR; INTRAVENOUS PRN
Status: DISCONTINUED | OUTPATIENT
Start: 2021-05-26 | End: 2021-05-26

## 2021-05-26 RX ORDER — ACETAMINOPHEN 325 MG/1
975 TABLET ORAL ONCE
Status: COMPLETED | OUTPATIENT
Start: 2021-05-26 | End: 2021-05-26

## 2021-05-26 RX ORDER — CEFAZOLIN SODIUM 1 G/3ML
1 INJECTION, POWDER, FOR SOLUTION INTRAMUSCULAR; INTRAVENOUS SEE ADMIN INSTRUCTIONS
Status: DISCONTINUED | OUTPATIENT
Start: 2021-05-26 | End: 2021-05-26 | Stop reason: HOSPADM

## 2021-05-26 RX ORDER — MORPHINE SULFATE 15 MG/1
15 TABLET, FILM COATED, EXTENDED RELEASE ORAL EVERY 12 HOURS
Qty: 10 TABLET | Refills: 0 | Status: SHIPPED | OUTPATIENT
Start: 2021-05-26 | End: 2021-05-31

## 2021-05-26 RX ORDER — SODIUM CHLORIDE, SODIUM LACTATE, POTASSIUM CHLORIDE, CALCIUM CHLORIDE 600; 310; 30; 20 MG/100ML; MG/100ML; MG/100ML; MG/100ML
INJECTION, SOLUTION INTRAVENOUS CONTINUOUS
Status: DISCONTINUED | OUTPATIENT
Start: 2021-05-26 | End: 2021-05-26 | Stop reason: HOSPADM

## 2021-05-26 RX ORDER — KETOROLAC TROMETHAMINE 30 MG/ML
30 INJECTION, SOLUTION INTRAMUSCULAR; INTRAVENOUS ONCE
Status: COMPLETED | OUTPATIENT
Start: 2021-05-26 | End: 2021-05-26

## 2021-05-26 RX ORDER — SCOLOPAMINE TRANSDERMAL SYSTEM 1 MG/1
1 PATCH, EXTENDED RELEASE TRANSDERMAL ONCE
Status: DISCONTINUED | OUTPATIENT
Start: 2021-05-26 | End: 2021-05-26 | Stop reason: HOSPADM

## 2021-05-26 RX ORDER — PROPOFOL 10 MG/ML
INJECTION, EMULSION INTRAVENOUS PRN
Status: DISCONTINUED | OUTPATIENT
Start: 2021-05-26 | End: 2021-05-26

## 2021-05-26 RX ORDER — BUPIVACAINE HYDROCHLORIDE 5 MG/ML
INJECTION, SOLUTION PERINEURAL PRN
Status: DISCONTINUED | OUTPATIENT
Start: 2021-05-26 | End: 2021-05-26 | Stop reason: HOSPADM

## 2021-05-26 RX ORDER — HYDROXYZINE HYDROCHLORIDE 25 MG/1
25 TABLET, FILM COATED ORAL EVERY 8 HOURS PRN
Qty: 30 TABLET | Refills: 0 | Status: SHIPPED | OUTPATIENT
Start: 2021-05-26 | End: 2021-06-04

## 2021-05-26 RX ORDER — FENTANYL CITRATE 50 UG/ML
INJECTION, SOLUTION INTRAMUSCULAR; INTRAVENOUS PRN
Status: DISCONTINUED | OUTPATIENT
Start: 2021-05-26 | End: 2021-05-26

## 2021-05-26 RX ADMIN — FENTANYL CITRATE 50 MCG: 50 INJECTION, SOLUTION INTRAMUSCULAR; INTRAVENOUS at 09:55

## 2021-05-26 RX ADMIN — Medication 3 G: at 10:06

## 2021-05-26 RX ADMIN — HYDROMORPHONE HYDROCHLORIDE 0.5 MG: 1 INJECTION, SOLUTION INTRAMUSCULAR; INTRAVENOUS; SUBCUTANEOUS at 10:43

## 2021-05-26 RX ADMIN — HYDROMORPHONE HYDROCHLORIDE 0.4 MG: 1 INJECTION, SOLUTION INTRAMUSCULAR; INTRAVENOUS; SUBCUTANEOUS at 14:20

## 2021-05-26 RX ADMIN — FENTANYL CITRATE 25 MCG: 50 INJECTION INTRAMUSCULAR; INTRAVENOUS at 11:49

## 2021-05-26 RX ADMIN — SCOPALAMINE 1 PATCH: 1 PATCH, EXTENDED RELEASE TRANSDERMAL at 08:24

## 2021-05-26 RX ADMIN — ONDANSETRON 4 MG: 2 INJECTION INTRAMUSCULAR; INTRAVENOUS at 10:36

## 2021-05-26 RX ADMIN — ONDANSETRON 4 MG: 2 INJECTION INTRAMUSCULAR; INTRAVENOUS at 12:38

## 2021-05-26 RX ADMIN — PROPOFOL 50 MCG/KG/MIN: 10 INJECTION, EMULSION INTRAVENOUS at 10:02

## 2021-05-26 RX ADMIN — HYDROMORPHONE HYDROCHLORIDE 0.3 MG: 1 INJECTION, SOLUTION INTRAMUSCULAR; INTRAVENOUS; SUBCUTANEOUS at 13:54

## 2021-05-26 RX ADMIN — SODIUM CHLORIDE, POTASSIUM CHLORIDE, SODIUM LACTATE AND CALCIUM CHLORIDE 100 ML/HR: 600; 310; 30; 20 INJECTION, SOLUTION INTRAVENOUS at 13:42

## 2021-05-26 RX ADMIN — LIDOCAINE HYDROCHLORIDE 100 MG: 20 INJECTION, SOLUTION INFILTRATION; PERINEURAL at 09:52

## 2021-05-26 RX ADMIN — ACETAMINOPHEN 975 MG: 325 TABLET, FILM COATED ORAL at 14:12

## 2021-05-26 RX ADMIN — FENTANYL CITRATE 50 MCG: 50 INJECTION, SOLUTION INTRAMUSCULAR; INTRAVENOUS at 10:26

## 2021-05-26 RX ADMIN — PROMETHAZINE HYDROCHLORIDE 12.5 MG: 25 INJECTION INTRAMUSCULAR; INTRAVENOUS at 13:35

## 2021-05-26 RX ADMIN — SODIUM CHLORIDE, POTASSIUM CHLORIDE, SODIUM LACTATE AND CALCIUM CHLORIDE: 600; 310; 30; 20 INJECTION, SOLUTION INTRAVENOUS at 09:44

## 2021-05-26 RX ADMIN — FENTANYL CITRATE 50 MCG: 50 INJECTION INTRAMUSCULAR; INTRAVENOUS at 12:50

## 2021-05-26 RX ADMIN — ROCURONIUM BROMIDE 50 MG: 10 INJECTION INTRAVENOUS at 09:56

## 2021-05-26 RX ADMIN — FENTANYL CITRATE 25 MCG: 50 INJECTION INTRAMUSCULAR; INTRAVENOUS at 12:02

## 2021-05-26 RX ADMIN — GABAPENTIN 300 MG: 300 CAPSULE ORAL at 07:42

## 2021-05-26 RX ADMIN — HYDROMORPHONE HYDROCHLORIDE 0.3 MG: 1 INJECTION, SOLUTION INTRAMUSCULAR; INTRAVENOUS; SUBCUTANEOUS at 14:05

## 2021-05-26 RX ADMIN — PROPOFOL 150 MG: 10 INJECTION, EMULSION INTRAVENOUS at 09:53

## 2021-05-26 RX ADMIN — ACETAMINOPHEN 975 MG: 325 TABLET, FILM COATED ORAL at 07:41

## 2021-05-26 RX ADMIN — KETOROLAC TROMETHAMINE 30 MG: 30 INJECTION, SOLUTION INTRAMUSCULAR; INTRAVENOUS at 14:04

## 2021-05-26 RX ADMIN — SUGAMMADEX 250 MG: 100 INJECTION, SOLUTION INTRAVENOUS at 11:07

## 2021-05-26 RX ADMIN — PROPOFOL 50 MG: 10 INJECTION, EMULSION INTRAVENOUS at 09:56

## 2021-05-26 RX ADMIN — DEXAMETHASONE SODIUM PHOSPHATE 8 MG: 4 INJECTION, SOLUTION INTRAMUSCULAR; INTRAVENOUS at 10:02

## 2021-05-26 RX ADMIN — FENTANYL CITRATE 50 MCG: 50 INJECTION INTRAMUSCULAR; INTRAVENOUS at 12:42

## 2021-05-26 RX ADMIN — FENTANYL CITRATE 25 MCG: 50 INJECTION INTRAMUSCULAR; INTRAVENOUS at 12:19

## 2021-05-26 RX ADMIN — MIDAZOLAM 2 MG: 1 INJECTION INTRAMUSCULAR; INTRAVENOUS at 09:44

## 2021-05-26 RX ADMIN — OXYCODONE HYDROCHLORIDE 5 MG: 5 TABLET ORAL at 14:31

## 2021-05-26 RX ADMIN — HYDROMORPHONE HYDROCHLORIDE 0.2 MG: 1 INJECTION, SOLUTION INTRAMUSCULAR; INTRAVENOUS; SUBCUTANEOUS at 12:10

## 2021-05-26 RX ADMIN — FENTANYL CITRATE 25 MCG: 50 INJECTION INTRAMUSCULAR; INTRAVENOUS at 11:39

## 2021-05-26 ASSESSMENT — MIFFLIN-ST. JEOR: SCORE: 2158

## 2021-05-26 NOTE — ANESTHESIA PROCEDURE NOTES
Airway       Patient location during procedure: OR  Staff -        Anesthesiologist:  Angela Best MD       Performed By: anesthesiologist and with residents       Procedure performed by resident/fellow/CRNA in presence of a teaching physician.    Consent for Airway        Urgency: elective  Indications and Patient Condition       Indications for airway management: cassidy-procedural       Induction type:intravenous       Mask difficulty assessment: 2 - vent by mask + OA or adjuvant +/- NMBA    Final Airway Details       Final airway type: endotracheal airway       Successful airway: ETT - single  Endotracheal Airway Details        ETT size (mm): 7.0       Cuffed: yes       Cuff volume (mL): 8       Successful intubation technique: video laryngoscopy       VL Blade Size: MAC D Blade       Grade View of Cords: 1       Adjucts: stylet       Position: Right       Measured from: gums/teeth       Secured at (cm): 22       Bite block used: None    Post intubation assessment        Placement verified by: capnometry, equal breath sounds and chest rise        Number of attempts at approach: 1       Number of other approaches attempted: 0       Secured with: silk tape       Ease of procedure: easy       Dentition: Intact and Unchanged    Medication(s) Administered   Medication Administration Time: 5/26/2021 10:00 AM

## 2021-05-26 NOTE — BRIEF OP NOTE
[facility]    Brief Operative Note    Pre-operative diagnosis: Tibial torsion, right [M21.861]  Post-operative diagnosis Same as pre-operative diagnosis    Procedure: Procedure(s):  OSTEOTOMY, TIBIA, OSTEOTOMY, FIBULA  Surgeon: Surgeon(s) and Role:     * Ronnie Castle MD - Primary   Suzy Davalos PA-C  Anesthesia: General with Adductor Block   Estimated blood loss: Less than 100 ml  Drains: None  Specimens: * No specimens in log *  Findings:   None.  Complications: None.  Implants:   Implant Name Type Inv. Item Serial No.  Lot No. LRB No. Used Action   tibial nail    LORIN [id number] Right 1 Implanted   5.0mm Cortical screw    LORIN [id number] Right 1 Implanted   5.0 cortical screw    LORIN [id number] Right 1 Implanted       Plan:  Same Day surgery discharge to home once criteria met.  CAM boot to remain on right  lower extremity and WBAT.  Oxycodone gabapentin, atarax and tylenol for pain.  No dressing change on own.  Leave dressing on until 2 weeks follow up appointment.  F/U in clinic in 2 weeks    I was asked by Dr. Castle to assist with surgery. I positioned and prepped the patient. I retracted soft tissue.   I suctioned fluids when needed. I provided traction for dissection. I helped to ligate blood vessels. I helped Dr. Castle identify and protect important structures. The procedure was medically necessary for an assistant because Dr. Castle needed the operative exposure and assistance that I provided. This allowed him to safely and efficiently operate. It was also important that I help ligate blood vessels to maintain hemostasis and reduce the bleeding risk. I helped with the closure of the operative incisions as well as helping with the boot/cast/splint.  The assistance that I provided reduced operative time which meant less general anesthetic for the patient. No qualified residents were available to assist.    Suzy Campbell PA-C

## 2021-05-26 NOTE — ANESTHESIA CARE TRANSFER NOTE
Patient: Suzy Lobato    Procedure(s):  OSTEOTOMY, TIBIA, OSTEOTOMY, FIBULA    Diagnosis: Tibial torsion, right [M21.861]  Diagnosis Additional Information: No value filed.    Anesthesia Type:   General     Note:    Oropharynx: oropharynx clear of all foreign objects and spontaneously breathing  Level of Consciousness: awake  Oxygen Supplementation: face mask    Independent Airway: airway patency satisfactory and stable  Dentition: dentition unchanged  Vital Signs Stable: post-procedure vital signs reviewed and stable  Report to RN Given: handoff report given  Patient transferred to: PACU    Handoff Report: Identifed the Patient, Identified the Reponsible Provider, Reviewed the pertinent medical history, Discussed the surgical course, Reviewed Intra-OP anesthesia mangement and issues during anesthesia, Set expectations for post-procedure period and Allowed opportunity for questions and acknowledgement of understanding      Vitals: (Last set prior to Anesthesia Care Transfer)  CRNA VITALS  5/26/2021 1052 - 5/26/2021 1126      5/26/2021             NIBP:  125/85    Pulse:  76    NIBP Mean:  90    Temp:  36.6  C (97.9  F)    SpO2:  97 %    Resp Rate (observed):  16        Electronically Signed By: ANASTASIYA Bella CRNA  May 26, 2021  11:26 AM

## 2021-05-26 NOTE — ANESTHESIA POSTPROCEDURE EVALUATION
Patient: Suzy Lobato    Procedure(s):  OSTEOTOMY, TIBIA, OSTEOTOMY, FIBULA    Diagnosis:Tibial torsion, right [M21.861]  Diagnosis Additional Information: No value filed.    Anesthesia Type:  General    Note:  Disposition: Outpatient   Postop Pain Control: Challenging            Challenges/Interventions: Exacerbation of chronic pain; Acute Pain; Multimodal therapy            Sign Out: Well controlled pain   PONV: Yes            Symptoms: Nausea only            Sign Out: PONV/POV resolved with treatment   Neuro/Psych: Uneventful            Sign Out: Acceptable/Baseline neuro status   Airway/Respiratory: Uneventful            Sign Out: Acceptable/Baseline resp. status   CV/Hemodynamics: Uneventful            Sign Out: Acceptable CV status; No obvious hypovolemia; No obvious fluid overload   Other NRE: NONE   DID A NON-ROUTINE EVENT OCCUR? No    Event details/Postop Comments:  Patient initially didn't have nausea (scop patch, propofol infusion, zofran (decadron declined by surgery)), but developed nausea with opiates for pain control and said nausea was worse than the pain. Nausea improved with phenergan. Pain continued to improve with toradol and hydromorphone. Patient tolerating PO and reports she feels like she can go home.            Last vitals:  Vitals:    05/26/21 1415 05/26/21 1430 05/26/21 1445   BP: 134/78 117/80 131/75   Pulse: 80 75    Resp: 17 15 14   Temp:  36.9  C (98.5  F) 36.7  C (98.1  F)   SpO2: 97% 96% 97%       Last vitals prior to Anesthesia Care Transfer:  CRNA VITALS  5/26/2021 1052 - 5/26/2021 1152      5/26/2021             NIBP:  125/85    Pulse:  76    NIBP Mean:  90    Temp:  36.6  C (97.9  F)    SpO2:  97 %    Resp Rate (observed):  16          Electronically Signed By: Angela Sandoval MD  May 26, 2021  2:58 PM

## 2021-05-26 NOTE — OR NURSING
Phoned & left message for AISLINN Campbell regarding pain management & if patient can have IV toradol or a block to manage pain.

## 2021-05-26 NOTE — ANESTHESIA PREPROCEDURE EVALUATION
Anesthesia Pre-Procedure Evaluation    Patient: Suzy Lobato   MRN: [id number] : 1990        Preoperative Diagnosis: Tibial torsion, right [M21.861]   Procedure : Procedure(s):  OSTEOTOMY, TIBIA, OSTEOTOMY, FIBULA     Past Medical History:   Diagnosis Date     Complication of anesthesia     PONV      History reviewed. No pertinent surgical history.   No Known Allergies   Social History     Tobacco Use     Smoking status: Never Smoker     Smokeless tobacco: Never Used   Substance Use Topics     Alcohol use: Yes     Comment: a few drinks per year      Wt Readings from Last 1 Encounters:   21 134.9 kg (297 lb 4.8 oz)        Anesthesia Evaluation   Pt has had prior anesthetic. Type: General.    History of anesthetic complications  - PONV.      ROS/MED HX  ENT/Pulmonary:     (+) recent URI, resolved, runny nose, stuffiness. no fever, colored sputum, wheezing or problems breathing:     Neurologic:  - neg neurologic ROS     Cardiovascular:       METS/Exercise Tolerance:     Hematologic:  - neg hematologic  ROS     Musculoskeletal: Comment: Tibial torsion      GI/Hepatic:  - neg GI/hepatic ROS     Renal/Genitourinary:       Endo:     (+) thyroid problem, hypothyroidism, Obesity,     Psychiatric/Substance Use:       Infectious Disease: Comment: Lymph node enlarged      Malignancy:       Other:     (-) Any chance pregnant       Physical Exam    Airway  airway exam normal    Comment: Thick neck    Mallampati: II   TM distance: > 3 FB   Neck ROM: full   Mouth opening: > 3 cm    Respiratory Devices and Support         Dental  no notable dental history         Cardiovascular   cardiovascular exam normal       Rhythm and rate: normal     Pulmonary   pulmonary exam normal        breath sounds clear to auscultation           OUTSIDE LABS:  CBC: No results found for: WBC, HGB, HCT, PLT  BMP: No results found for: NA, POTASSIUM, CHLORIDE, CO2, BUN, CR, GLC  COAGS: No results found for: PTT, INR, FIBR  POC: No results  found for: BGM, HCG, HCGS  HEPATIC: No results found for: ALBUMIN, PROTTOTAL, ALT, AST, GGT, ALKPHOS, BILITOTAL, BILIDIRECT, OCTAVIO  OTHER: No results found for: PH, LACT, A1C, EBONI, PHOS, MAG, LIPASE, AMYLASE, TSH, T4, T3, CRP, SED    Anesthesia Plan    ASA Status:  2   NPO Status:  NPO Appropriate    Anesthesia Type: General.     - Airway: ETT   Induction: Intravenous.   Maintenance: Balanced.   Techniques and Equipment:     - Airway: Video-Laryngoscope         Consents    Anesthesia Plan(s) and associated risks, benefits, and realistic alternatives discussed. Questions answered and patient/representative(s) expressed understanding.     - Discussed with:  Patient      - Extended Intubation/Ventilatory Support Discussed: No.      - Patient is DNR/DNI Status: No    Use of blood products discussed: No .     Postoperative Care    Pain management: Oral pain medications, Multi-modal analgesia.   PONV prophylaxis: Background Propofol Infusion, Dexamethasone or Solumedrol, Ondansetron (or other 5HT-3), Scopolamine patch     Comments:    Discussed risks of anesthesia including nausea, vomiting, sore throat, dental damage, cardiopulmonary complications, neurologic complications, and serious complications.              Angela Sandoval MD

## 2021-05-26 NOTE — OR NURSING
AISLINN Campbell at bedside to see patient, continues to have pain & nausea issues off & on - OK per PA to administer one time dose IV toradol, but patient can not take ibuprofen at home.  Also, PA stated  does not want a popliteal block, other blocks could be ok if [facility] approves and to note 40ml 1/2% marcaine local given at end of case as well.  notified & no block to be given at this time per [facility].

## 2021-05-26 NOTE — DISCHARGE INSTRUCTIONS
Same-Day Surgery   Adult Discharge Orders & Instructions     For 24 hours after surgery:  1. Get plenty of rest.  A responsible adult must stay with you for at least 24 hours after you leave the hospital.   2. Pain medication can slow your reflexes. Do not drive or use heavy equipment.  If you have weakness or tingling, don't drive or use heavy equipment until this feeling goes away.  3. Mixing alcohol and pain medication can cause dizziness and slow your breathing. It can even be fatal. Do not drink alcohol while taking pain medication.  4. Avoid strenuous or risky activities.  Ask for help when climbing stairs.   5. You may feel lightheaded.  If so, sit for a few minutes before standing.  Have someone help you get up.   6. If you have nausea (feel sick to your stomach), drink only clear liquids such as apple juice, ginger ale, broth or 7-Up.  Rest may also help.  Be sure to drink enough fluids.  Move to a regular diet as you feel able. Take pain medications with a small amount of solid food, such as toast or crackers, to avoid nausea.   7. A slight fever is normal. Call the doctor if your fever is over 100 F (37.7 C) (taken under the tongue) or lasts longer than 24 hours.  8. You may have a dry mouth, muscle aches, trouble sleeping or a sore throat.  These symptoms should go away after 24 hours.  9. Do not make important or legal decisions.   Pain Management:      1. Take pain medication (if prescribed) for pain as directed by your physician.        2. WARNING: If the pain medication you have been prescribed contains Tylenol  (acetaminophen), DO NOT take additional doses of Tylenol (acetaminophen).     Call your doctor for any of the followin.  Signs of infection (fever, growing tenderness at the surgery site, severe pain, a large amount of drainage or bleeding, foul-smelling drainage, redness, swelling).    2.  It has been over 8 to 10 hours since surgery and you are still not able to urinate (pee).    3.   "Headache for over 24 hours.    4.  Numbness, tingling or weakness the day after surgery   To contact a doctor 9am-5pm Monday through Friday, call Dr. Castle's clinic at [phone] or:      After 5pm on weekdays or anytime during the weekend call [phone] and ask for the Resident On Call for:         Orthopedics  (answered 24 hours a day)      Emergency Department:  [facility]: [phone]  [facility]: [phone]      Safety Tips for Using Crutches    Crutch Fit:    Assume good standing posture with shoulders relaxed and crutch tips 6-8 inches out from the side of the foot.    The underarm pad should fall 2-3 fingers width below the armpit.    The handgrip is positioned level with the wrist to allow 30  flexion at the elbow.    Safety Tips:    Bear weight on your hands, not on your armpits.    Do not add extra padding to the underarm pad. This will, in effect, lengthen the crutches and increase risk of nerve injury.    Wear flat, properly fitting shoes. Do not walk in stocking feet, high heels or slippers.    Household hazards:  --Throw rugs should be removed from floors.  --Stairs should be cleared of obstacles.  --Use extra caution on slippery, highly polished, littered or uneven floor surfaces.  --Check for electric cords.    Check crutch tips for excessive wear and keep wing nuts tight.    While walking, look forward with  head up  and  eyes open.  Take equal length steps.    Use BOTH crutches.    Stairs Sequence:    UP: \"Good\" leg first, followed by  bad  leg, then crutches.    DOWN: Crutches, followed by  bad  leg, \"good\" leg.     Walking with Crutches:    Move both crutches forward at the same time.    Non-Weight Bearing (NWB):  Hold the involved leg up and swing through the crutches with the involved leg. The involved leg does not touch the floor.    Toe Touch Weight Bearing (TTWB): Move the involved leg forward. Rest it lightly on the floor for balance only. " Step through the crutches with the uninvolved leg.    Partial Weight Bearing (PWB): Move the involved leg forward. Step down the weight of the leg only.  Step through the crutches with the uninvolved leg.    Weight Bearing As Tolerated (WBAT): Move the involved leg forward. Put as much pressure through the involved leg as you can tolerate comfortably. Then step through the crutches with the uninvolved leg.    Scopolamine Patch  (Absorbed through the skin)    The Scopolamine Patch prevents nausea and vomiting caused by motion sickness or anesthesia and surgery in adults.  ** Recommend removing tomorrow/Thursday morning in 24 hours. May be left on for up to 72 hours - but must be removed at this time if not already done, remove at latest by   Saturday May 29th, morning 0800 **    Brand Name(s): Transderm Scop, Transderm-Scope  There may be other brand names for this medicine.    When This Medicine Should Not Be Used:  You should not use this medicine if you have had an allergic reaction to scopolamine, or if you have narrow angle glaucoma.    How to Use This Medicine:    Your doctor will tell you how many patches to use, where to apply them, and how often to apply them.     Do not use more patches or apply them more often than your doctor tells you to.    This medicine comes with patient instructions. Read and follow these instructions carefully. Ask your doctor or pharmacist if you have any questions.    To prevent motion sickness, apply the patch at least 4 hours before you need it.    Wash and dry your hands thoroughly before applying the patch.    Leave the patch in its sealed wrapper until you are ready to put it on. Tear the wrapper open carefully. NEVER CUT the wrapper or the patch with scissors. Do not use any patch that has been cut by accident.    Take the liner off the sticky side before applying.    Apply the patch to dry, hairless skin behind the ear.    If the patch is loose or falls off, apply a new  patch at a different place behind the ear.    After you take off the patch, wash the place where the patch was and your hands thoroughly.    Only one patch should be used at any time.    If a dose is missed:  If you forget to wear or change a patch, put one on as soon as you can. If it is almost time to put on your next patch, wait until then to apply a new patch and skip the one you missed. Do not apply extra patches to make up for a missed dose.    How to Store and Dispose of This Medicine:    Store the patches at room temperature in a closed container, away from heat, moisture and direct light.    Fold the used patch in half with the sticky sides together. Throw any used patch away so that children or pets cannot get to it. You will also need to throw away old patches after the expiration date has passed.    Keep all medicine away from children and never share your medicine with anyone.    Ask your doctor or pharmacist before using any other medicine, including over-the-counter medicines, vitamins, and herbal products.  Tell your doctor if you are using any medicines that make you sleepy. These include sleeping pills, cold and allergy medicine, narcotic pain relievers and sedatives.     Tell your doctor if you are using any medicine that make you sleepy. These include sleeping pills, margarita and allergy medicine, narcotic pain relievers and sedatives.    Do not drink alcohol while you are using this medicine.     Warnings While Using This Medicine:    Make sure your doctor knows if you are pregnant or breastfeeding or if you have glaucoma, prostate problems, trouble urinating, blocked bowels, liver disease, kidney disease or a history of seizures or mental illness.    This medicine can cause blurring of vision and other vision problems if it comes in contact with the eyes. This medicine may also cause problems with urination. If any of these reactions occur, remove the patch and call your doctor right away.    This  medicine may make you dizzy or drowsy. Avoid driving, using machines, or doing anything else that could be dangerous if you are not alert. If you plan to participate in underwater sports, this medicine may cause disorienting effects. If this is a concern for you, talk with your doctor.    This medicine may make you sweat less and cause your body to get too hot. Be careful in hot weather, when you are exercising or if using sauna or whirlpool.    Make sure any doctor or dentist who treats you knows that you are using this medicine. This medicine may affect the results of certain medical tests.    Skin burns have been reported at the patch site in several patients wearing an aluminized transdermal system during a magnetic resonance imaging scan (MRI). Because Transderm Scop contains aluminum, it is recommended to remove the system before undergoing an MRI.    Call your doctor right away if you notice any of these side effects:    Allergic reaction: Itching or hives, swelling in your face or hands, swelling or tingling in your mouth or throat, chest tightness, trouble breathing    Blurred vision    Confusion or memory loss    Fast, slow or uneven heartbeat    Lightheadedness, dizziness, drowsiness or fainting    Seeing, hearing or feeling things that are not there    Severe eye pain    Trouble urinating    If you notice these less serious side effects, talk with your doctor:    Dry mouth    Dry, itchy or red eyes    Restlessness    Skin rash or redness    If you notice other side effects that you think are caused by this medicine, tell your doctor immediately.

## 2021-05-26 NOTE — OR NURSING
Handoff given to Madeline SY RN in pacu at this time. Discharge meds (oxycodone, hydroxyzine and gabapentin) at bedside.

## 2021-06-01 NOTE — OP NOTE
Procedure Date: 05/26/2021    PREOPERATIVE DIAGNOSIS:  Right increased tibial torsion.    POSTOPERATIVE DIAGNOSIS:  Right increased tibial torsion.    PROCEDURE:  Right tibia and fibula derotational osteotomy using 24 degrees of internal rotation.    SURGEON:  Ronnie Castle MD    ASSISTANT:  Suzy Campbell PA-C. Ms. Davalos's assistance was required in order to provide assistance with positioning, the surgery itself, holding retractors, closure of the wound and application of immobilization devices.  At the time of the surgery, there was no available help from an orthopedic trainee.    COMPLICATIONS:  None.    DRAINS:  None.    ESTIMATED BLOOD LOSS:  Less than 20 mL    ANESTHESIA:  General endotracheal.    INDICATIONS FOR PROCEDURE:  Please refer to clinic notes for further details.  Discussed indications on Mrs. Lobato's case.    DESCRIPTION OF PROCEDURE:  On 05/26/2021, patient was taken to surgery.  Preoperative antibiotics were administered to the patient prior to arrival to the OR.    After successful induction of general endotracheal anesthesia, she was placed supine on the operating table.  The right lower extremity was prepped and draped in sterile fashion.  After exsanguination by gravity, tourniquet cuff was inflated to 300 mmHg on the proximal third of the right thigh.    The pause for the cause was performed according to our institution's policy which confirmed laterality of the procedure.    We proceeded with an incision on the medial border of the cassidy-patellar tendon.  Subcutaneous tissues were dissected.  An entry point was identified across the proximal portion of the tibia, which was done under fluoroscopic control.  The K-wire was placed into the area, which was followed by an opening reamer.  Subsequent to this, we proceeded with placement of a guidewire into the canal and we proceeded with reaming up to 11.5 mm.    We proceeded then with placement of a K-wire across the proximal  metaphysis, followed by a second K-wire along the distal metaphysis.  They were offset by 24 degrees.  Another incision was made along the medial cortex of the tibia along the mid third.  Subcutaneous tissues were dissected.  We proceeded with application of the tibia and multiple perforations were created with a drill bit.    Another incision was made along the lateral aspect of the lower leg.  We proceeded with the fixation of the fibula.  Blunt dissection was proceeded with an osteotomy with an oscillating saw.    At this point, we proceeded with stripping of the periosteum, which was followed by placement of a guidewire and subsequently the nail across the tibia.  We proceeded with a distal locking of the nail through a freehand technique while padding both K-wires parallel to each other therefore producing 24 degrees of internal rotation of the distal portion of the tibia.  This was followed by proximal locking in the dynamic hole.    We confirmed under fluoroscopic examination in AP and lateral projections of the tibia to have excellent reduction of the osteotomy site as well as location of the hardware.  These images were sent to PACS for definitive documentation.    Tourniquet was deflated.  Satisfactory hemostasis was accomplished.  Wound was closed in layers.  Sterile dressings were applied.  The patient was placed in a tall Cam walker and transferred in stable condition to PACU.    PLAN:  The patient will remain weightbearing as tolerated.  She will utilize a CAM walker at all times except for hygiene for the first 2 weeks from surgery.  At the 2-week appointment the patient will proceed with the use of the Cam walker at all times except for hygiene, resting, sitting and sleeping activities.  She will proceed with regular physical therapy protocol for the tibia and fibula derotational osteotomies according to the Department of Orthopedic Surgery [facility].    The patient will be reevaluated  at 6 weeks from surgery, and at that time, an AP and lateral x-rays of the right tibia will be obtained, and based on those findings, further recommendations will be given to the patient.    Ronnie Castle MD        D: 2021   T: 2021   MT: [id number]    Name:     NORRIS ALANIS  MRN:      [id number]        Account:        [id number]   :      1990           Procedure Date: 2021     Document: [id number]

## 2021-06-02 ENCOUNTER — TELEPHONE (OUTPATIENT)
Dept: OTHER | Facility: CLINIC | Age: 31
End: 2021-06-02

## 2021-06-03 NOTE — TELEPHONE ENCOUNTER
Brief Orthopedic Telephone Note    Contacted by the patient's  regarding pain in the surgical extremity. The patient underwent tib/fib osteotomy with Dr. Castle on 5/26/21. She has ongoing swelling. She is currently elevating and icing. Denies fever, chills, cp, sob. No drainage from wounds or concerns for infection. No new injury or fall. Her medication regimen currently consists of Tylenol 975 mg QID, oxycodone 10 mg daily, hydroxyzine 25 mg daily and gabapentin 300 mg at night. We discussed the ability to expand her oxycodone use (5-10 mg q4h PRN) and hydroxyzine (25-50 mg q6-8h PRN). Additionally, it would be ok for the patient to take 800 mg ibuprofen PRN. I discouraged her from taking ibuprofen regularly or scheduled, however she may benefit from individual doses as needed. I also encouraged them to continue to elevate, ice and use compression for swelling. I reiterated the limitations for medication refills on nights and weekends and encouraged them to call back during regular business hours for pain/medication questions if issues persist. The plan of care was discussed at length and all questions were answered.    Jay Piña MD  Orthopaedic Surgery, PGY4  [phone]

## 2021-06-04 DIAGNOSIS — M79.604 PAIN OF RIGHT LOWER EXTREMITY: ICD-10-CM

## 2021-06-04 RX ORDER — OXYCODONE HYDROCHLORIDE 5 MG/1
5 TABLET ORAL EVERY 4 HOURS PRN
Qty: 24 TABLET | Refills: 0 | Status: SHIPPED | OUTPATIENT
Start: 2021-06-04 | End: 2022-07-13

## 2021-06-04 RX ORDER — HYDROXYZINE HYDROCHLORIDE 25 MG/1
25 TABLET, FILM COATED ORAL EVERY 8 HOURS PRN
Qty: 30 TABLET | Refills: 0 | Status: SHIPPED | OUTPATIENT
Start: 2021-06-04 | End: 2022-07-13

## 2021-06-04 NOTE — TELEPHONE ENCOUNTER
Suzy underwent right tib/fib osteotomy 9 days ago with Dr Castle on 5/26/21.  Pt was called by RN regarding pain management, she was given #40 tabs oxycodone day of surgery and she has 10 tabs left.  She also takes gabapentin, hydroxyzine, and tylenol.  Pt agreed to tapering as able.  Refill will be routed to [facility] for authorization.  Rosetta Celeste RN

## 2021-06-04 NOTE — TELEPHONE ENCOUNTER
[facility] Call Center    Phone Message    May a detailed message be left on voicemail: yes     Reason for Call: Medication Refill Request    Has the patient contacted the pharmacy for the refill? Yes   Name of medication being requested: oxycodone  Provider who prescribed the medication: Dr Castle  Pharmacy: [facility]  Date medication is needed: 6/4/21       Action Taken: Message routed to:  [facility] ortho    Travel Screening: Not Applicable

## 2021-06-09 ENCOUNTER — ALLIED HEALTH/NURSE VISIT (OUTPATIENT)
Dept: ORTHOPEDICS | Facility: CLINIC | Age: 31
End: 2021-06-09
Payer: OTHER GOVERNMENT

## 2021-06-09 DIAGNOSIS — M21.861 TIBIAL TORSION, RIGHT: Primary | ICD-10-CM

## 2021-06-09 PROCEDURE — 99024 POSTOP FOLLOW-UP VISIT: CPT

## 2021-06-09 NOTE — PROGRESS NOTES
Reason for visit:    Suzy Lobato came in to the clinic for a two week post op check.    Her surgery was done 5/26/21 by Dr Castle.  She had right tibia and fibula     Assessment:    Suzy came into the clinic in CAm walker Partial WB    The Surgical wounds were exposed and found to be well-healed and without evidence of infection; so the sutures were removed. CMS was found to be intact    Plan:     She was placed in CAM walker that will be utilized at all times except resting, sitting, sleeping and hygiene.  She was told to remain WBAT. She will progress with physical therapy. The protocol was dispensed to her today and she has an appointment with PT on Friday this week.      She has an appointment to see Dr. Castle at that time Dr. Castle will determine further restrictions.    She has our phone number and will call with questions or problems.      Emmie Andersen ATC    Answers for HPI/ROS submitted by the patient on 6/8/2021   General Symptoms: No  Skin Symptoms: No  HENT Symptoms: No  EYE SYMPTOMS: No  HEART SYMPTOMS: No  LUNG SYMPTOMS: No  INTESTINAL SYMPTOMS: No  URINARY SYMPTOMS: No  GYNECOLOGIC SYMPTOMS: No  BREAST SYMPTOMS: No  SKELETAL SYMPTOMS: No  BLOOD SYMPTOMS: No  NERVOUS SYSTEM SYMPTOMS: No  MENTAL HEALTH SYMPTOMS: No

## 2021-07-06 ENCOUNTER — ANCILLARY PROCEDURE (OUTPATIENT)
Dept: GENERAL RADIOLOGY | Facility: CLINIC | Age: 31
End: 2021-07-06
Attending: ORTHOPAEDIC SURGERY
Payer: OTHER GOVERNMENT

## 2021-07-06 ENCOUNTER — OFFICE VISIT (OUTPATIENT)
Dept: ORTHOPEDICS | Facility: CLINIC | Age: 31
End: 2021-07-06
Payer: OTHER GOVERNMENT

## 2021-07-06 DIAGNOSIS — M21.861 TIBIAL TORSION, RIGHT: ICD-10-CM

## 2021-07-06 DIAGNOSIS — M25.561 ARTHRALGIA OF RIGHT KNEE: Primary | ICD-10-CM

## 2021-07-06 PROCEDURE — 73590 X-RAY EXAM OF LOWER LEG: CPT | Mod: RT | Performed by: RADIOLOGY

## 2021-07-06 PROCEDURE — 99024 POSTOP FOLLOW-UP VISIT: CPT | Performed by: ORTHOPAEDIC SURGERY

## 2021-07-06 NOTE — NURSING NOTE
Reason For Visit:   Chief Complaint   Patient presents with     RECHECK     Right tibia and fibula osteotomy DOS: 5/26/21       There were no vitals taken for this visit.    Pain Assessment  Patient Currently in Pain: Yes  0-10 Pain Scale: 3  Primary Pain Location: Leg    Emmie Andersen, ATC

## 2021-07-06 NOTE — LETTER
7/6/2021         RE: Suzy Lobato  [address]  [place] [address]        Dear Colleague,    Thank you for referring your patient, Suzy Lobato, to the [facility] [place]. Please see a copy of my visit note below.    CHIEF COMPLAINT:  Status post right tibia and fibula derotational osteotomies performed on 05/26/2021.    HISTORY OF PRESENT ILLNESS:  Ms. Lobato presents today for further followup.  Overall, she reports to be doing well.  Reports to have no major problems.  Reports occasionally to have some pain and discomfort across the osteotomy site.  Otherwise, reports to have no issues.  Continues working with physical therapy.    PHYSICAL EXAMINATION:  On today's visit, she presents with full range of motion of the right knee and ankle.  CMS is intact.  Skin is intact.  There is some very mild discomfort with palpation of the osteotomy site for the tibia.    The patient also reports to have some neurological changes and numbness and tingling at nighttime but it is not enough to take any medication.    IMAGING:  AP and lateral x-rays of the tibia were obtained today, which were significant for showing partial consolidation of the osteotomy site.  Hardware is intact and in place.  There is bone formation.    ASSESSMENT:  Status post right tibia and fibula derotational osteotomies.    PLAN:  Discussed with the patient that at this point we are going to advance her protocol to discontinue the CAM Walker and proceed with physical therapy according to the Orthopedic Department at [facility] protocol for tibia derotational osteotomies.    The patient will follow up in 6 weeks from now, and at that time, two views of the right tibia will be obtained, and based on those findings, further recommendations will be given to the patient.        Ronnie Castle MD

## 2021-07-06 NOTE — PROGRESS NOTES
CHIEF COMPLAINT:  Status post right tibia and fibula derotational osteotomies performed on 05/26/2021.    HISTORY OF PRESENT ILLNESS:  Ms. Lobato presents today for further followup.  Overall, she reports to be doing well.  Reports to have no major problems.  Reports occasionally to have some pain and discomfort across the osteotomy site.  Otherwise, reports to have no issues.  Continues working with physical therapy.    PHYSICAL EXAMINATION:  On today's visit, she presents with full range of motion of the right knee and ankle.  CMS is intact.  Skin is intact.  There is some very mild discomfort with palpation of the osteotomy site for the tibia.    The patient also reports to have some neurological changes and numbness and tingling at nighttime but it is not enough to take any medication.    IMAGING:  AP and lateral x-rays of the tibia were obtained today, which were significant for showing partial consolidation of the osteotomy site.  Hardware is intact and in place.  There is bone formation.    ASSESSMENT:  Status post right tibia and fibula derotational osteotomies.    PLAN:  Discussed with the patient that at this point we are going to advance her protocol to discontinue the CAM Walker and proceed with physical therapy according to the Orthopedic Department at [facility] protocol for tibia derotational osteotomies.    The patient will follow up in 6 weeks from now, and at that time, two views of the right tibia will be obtained, and based on those findings, further recommendations will be given to the patient.

## 2021-07-08 DIAGNOSIS — M79.604 PAIN OF RIGHT LOWER EXTREMITY: Primary | ICD-10-CM

## 2021-07-23 ENCOUNTER — TELEPHONE (OUTPATIENT)
Dept: ORTHOPEDICS | Facility: CLINIC | Age: 31
End: 2021-07-23

## 2021-07-23 NOTE — TELEPHONE ENCOUNTER
[facility] Call Center    Phone Message    May a detailed message be left on voicemail: yes     Reason for Call: Order(s): Other:   Reason for requested: 05/26/2021 Post Surgical Order for Physical Therapy  Date needed: ASAP  Provider name: Ronnie Castle    Please fax order to Jacey at [phone]      Action Taken: Message routed to:  [facility] Orthopedics    Travel Screening: Not Applicable

## 2021-08-17 ENCOUNTER — ANCILLARY PROCEDURE (OUTPATIENT)
Dept: GENERAL RADIOLOGY | Facility: CLINIC | Age: 31
End: 2021-08-17
Attending: ORTHOPAEDIC SURGERY
Payer: OTHER GOVERNMENT

## 2021-08-17 ENCOUNTER — OFFICE VISIT (OUTPATIENT)
Dept: ORTHOPEDICS | Facility: CLINIC | Age: 31
End: 2021-08-17
Payer: OTHER GOVERNMENT

## 2021-08-17 ENCOUNTER — THERAPY VISIT (OUTPATIENT)
Dept: PHYSICAL THERAPY | Facility: CLINIC | Age: 31
End: 2021-08-17
Payer: OTHER GOVERNMENT

## 2021-08-17 DIAGNOSIS — M21.861 TIBIAL TORSION, RIGHT: Primary | ICD-10-CM

## 2021-08-17 DIAGNOSIS — Z47.89 AFTERCARE FOLLOWING SURGERY OF THE MUSCULOSKELETAL SYSTEM: ICD-10-CM

## 2021-08-17 DIAGNOSIS — M79.604 PAIN OF RIGHT LOWER EXTREMITY: ICD-10-CM

## 2021-08-17 DIAGNOSIS — M25.561 ARTHRALGIA OF RIGHT KNEE: Primary | ICD-10-CM

## 2021-08-17 PROCEDURE — 99024 POSTOP FOLLOW-UP VISIT: CPT | Performed by: ORTHOPAEDIC SURGERY

## 2021-08-17 PROCEDURE — 97530 THERAPEUTIC ACTIVITIES: CPT | Mod: GP | Performed by: PHYSICAL THERAPIST

## 2021-08-17 PROCEDURE — 97161 PT EVAL LOW COMPLEX 20 MIN: CPT | Mod: GP | Performed by: PHYSICAL THERAPIST

## 2021-08-17 PROCEDURE — 97110 THERAPEUTIC EXERCISES: CPT | Mod: GP | Performed by: PHYSICAL THERAPIST

## 2021-08-17 PROCEDURE — 73590 X-RAY EXAM OF LOWER LEG: CPT | Mod: RT | Performed by: RADIOLOGY

## 2021-08-17 NOTE — LETTER
8/17/2021         RE: Suzy Lobato  [address]  [place] [address]        Dear Colleague,    Thank you for referring your patient, Suzy Lobato, to the [facility] [place]. Please see a copy of my visit note below.    CHIEF COMPLAINT:  Status post right tibia and fibula derotational osteotomy performed on 05/26/2021.    HISTORY OF PRESENT ILLNESS:  Mrs. Lobato presents today for further followup.  Reports to be doing well.  Reports to be making progress.  Presents today with 1 crutch as instructed by her physical therapist.  She is also very clear about the fact that this is the first time her therapist has seen this type of surgery.    PHYSICAL EXAMINATION:  On today's visit, she presents with full range of motion of the right knee and ankle.  CMS intact.  Skin is intact.  There are well-healed surgical incisions.    RADIOGRAPHS:  Two views of the right tibia were obtained today, which are significant for showing further callous formation and consolidation across the osteotomy sites.  Hardware is intact and in place.    ASSESSMENT:  Status post right tibia and fibula derotational osteotomies.    PLAN:  Discussed with the patient that she is making progress according to the plan.  I would like her to contact our physical therapist to give her some hints on how to continue making progress as well as to have our own therapist contact her therapist and to see how we can improve her recovery.    The patient will follow up on a p.r.n. basis.  All questions were answered.  The patient was pleased with discussion.          Ronnie Castle MD

## 2021-08-17 NOTE — PROGRESS NOTES
CHIEF COMPLAINT:  Status post right tibia and fibula derotational osteotomy performed on 05/26/2021.    HISTORY OF PRESENT ILLNESS:  Mrs. Lobato presents today for further followup.  Reports to be doing well.  Reports to be making progress.  Presents today with 1 crutch as instructed by her physical therapist.  She is also very clear about the fact that this is the first time her therapist has seen this type of surgery.    PHYSICAL EXAMINATION:  On today's visit, she presents with full range of motion of the right knee and ankle.  CMS intact.  Skin is intact.  There are well-healed surgical incisions.    RADIOGRAPHS:  Two views of the right tibia were obtained today, which are significant for showing further callous formation and consolidation across the osteotomy sites.  Hardware is intact and in place.    ASSESSMENT:  Status post right tibia and fibula derotational osteotomies.    PLAN:  Discussed with the patient that she is making progress according to the plan.  I would like her to contact our physical therapist to give her some hints on how to continue making progress as well as to have our own therapist contact her therapist and to see how we can improve her recovery.    The patient will follow up on a p.r.n. basis.  All questions were answered.  The patient was pleased with discussion.

## 2021-08-17 NOTE — NURSING NOTE
Reason For Visit:   Chief Complaint   Patient presents with     RECHECK     12 week POP Right tibia and fibula osteotomy DOS: 5/26/21       There were no vitals taken for this visit.    Pain Assessment  Patient Currently in Pain: Yes  0-10 Pain Scale: 3  Primary Pain Location: Leg    Emmie Andersen ATC

## 2021-08-18 PROBLEM — Z47.89 AFTERCARE FOLLOWING SURGERY OF THE MUSCULOSKELETAL SYSTEM: Status: ACTIVE | Noted: 2021-08-18

## 2021-08-18 ASSESSMENT — ACTIVITIES OF DAILY LIVING (ADL)
RAW_SCORE: 51
KNEE_ACTIVITY_OF_DAILY_LIVING_SUM: 51
GIVING WAY, BUCKLING OR SHIFTING OF KNEE: THE SYMPTOM AFFECTS MY ACTIVITY MODERATELY
WEAKNESS: THE SYMPTOM AFFECTS MY ACTIVITY SLIGHTLY
GO UP STAIRS: ACTIVITY IS NOT DIFFICULT
STIFFNESS: I HAVE THE SYMPTOM BUT IT DOES NOT AFFECT MY ACTIVITY
STAND: ACTIVITY IS NOT DIFFICULT
SWELLING: I HAVE THE SYMPTOM BUT IT DOES NOT AFFECT MY ACTIVITY
PAIN: I HAVE THE SYMPTOM BUT IT DOES NOT AFFECT MY ACTIVITY
RISE FROM A CHAIR: ACTIVITY IS NOT DIFFICULT
KNEEL ON THE FRONT OF YOUR KNEE: ACTIVITY IS SOMEWHAT DIFFICULT
LIMPING: THE SYMPTOM AFFECTS MY ACTIVITY MODERATELY
GO DOWN STAIRS: ACTIVITY IS NOT DIFFICULT
KNEE_ACTIVITY_OF_DAILY_LIVING_SCORE: 72.86
SQUAT: ACTIVITY IS FAIRLY DIFFICULT
WALK: ACTIVITY IS SOMEWHAT DIFFICULT
SIT WITH YOUR KNEE BENT: ACTIVITY IS MINIMALLY DIFFICULT

## 2021-08-18 NOTE — PROGRESS NOTES
Physical Therapy Initial Evaluation: Subjective History  Date of Surgery: 5/26/2021.    Surgical Procedure/Limb: Right tib-fib derotation osteotomy  Surgeon Name: Dr. Castle  Precautions/Restrictions:None, continues to ambulate with 1 crutch     Average Daily Pain Levels: 2-3/10 (Location: medial sided knee pain, denies pain at osteotomy site; Quality: Aching/Throbbing)  Other Symptoms: Denies numbness/ingling, feels a lot of her calf pain is muscle relate  Symptom Mgmt Strategies: Crutches, modification of activities    Prior orthopaedic history/procedures: See Epic  Prior non-operative management: See Epic - has been participating in PT locally  Next MD Appt Date: PRN    Functional limitations following procedure: Ambulation, stairs, standing  Previous level of function: Was painful but able to do most things.     Patient Employment:   Desired Physical Activity (Goals): Return to just being generally active    Post-Operative Physical Therapy Examination    Physical Mobility Status  Gait: Ambulating with antalgic gait pattern, lacking heel to toe motion as well as lacking knee flexion/cushion during the gait cycle.   Transfers:  Independent    Anthropometric Measures     Right Left Difference   Joint ROM      Hyperextension 0 deg 3 deg 3 deg   Extension 3 deg 0 deg 3 deg   Flexion WFL deg WFL deg - deg                     Effusion 0 0        Quadriceps Muscle Activation Right Left   Isometric Quad Activation Fair and Depressed intensity Normal   Straight Leg Raising Extensor lag of 8 deg No extensor lag     No limitations in motion with ankle DF. Symmetrical knee>wall measurement.     Quadriceps tightness noted with prone knee flexion.     Presents with disuse atrophy of the quadriceps muscles  Nerve supply to the muscle is intact  Physical therapy alone is not sufficient to treat disuse atrophy  Treatment area is large and includes multiple sites - patient cannot feasibly deliver treatment with  standard electrodes, conducive garment required    Assessment/Plan  Suzy presents 3 months s/p the above procedure. She has progressed slower than expected in regards to her gait and function. I believe that her recovery has been complicated by her knee and the cartilage defects that are present in both her PF and TF compartments. She would benefit from more of a focused approach of therapy to her knee including quadriceps strengthening, regaining full knee extension ROM and improving her quadriceps control with gait. Her ankle and tibial osteotomy is healing well and should continue to work on gastroc strengthening. Skilled physical therapy is necessary to improve her current functional status.     Patient is a 31 year old female with right side knee complaints.    Patient has the following significant findings with corresponding treatment plan.                Diagnosis 1:  Right tib-fib derotation osteotomy  Pain -  manual therapy, STS, splint/taping/bracing/orthotics and self management  Decreased ROM/flexibility - manual therapy, therapeutic exercise and home program  Decreased joint mobility - manual therapy, therapeutic exercise and home program  Decreased strength - therapeutic exercise, therapeutic activities and home program  Impaired gait - gait training and home program  Impaired muscle performance - neuro re-education and home program  Decreased function - therapeutic activities and home program    Therapy Evaluation Codes:   1) History comprised of:   Personal factors that impact the plan of care:      None.    Comorbidity factors that impact the plan of care are:      None.     Medications impacting care: None.  2) Examination of Body Systems comprised of:   Body structures and functions that impact the plan of care:      Knee.   Activity limitations that impact the plan of care are:      Squatting/kneeling, Stairs, Standing and Walking.  3) Clinical presentation characteristics  are:   Stable/Uncomplicated.  4) Decision-Making    Low complexity using standardized patient assessment instrument and/or measureable assessment of functional outcome.  Cumulative Therapy Evaluation is: Low complexity.    Previous and current functional limitations:  (See Goal Flow Sheet for this information)    Short term and Long term goals: (See Goal Flow Sheet for this information)     Communication ability:  Patient appears to be able to clearly communicate and understand verbal and written communication and follow directions correctly.  Treatment Explanation - The following has been discussed with the patient:   RX ordered/plan of care  Anticipated outcomes  Possible risks and side effects  This patient would benefit from PT intervention to resume normal activities.   Rehab potential is good.    Frequency:  1 X week, once daily  Duration:  for 1 weeks  Discharge Plan:  Achieve all LTG.  Independent in home treatment program.  Reach maximal therapeutic benefit.    Please refer to the daily flowsheet for treatment today, total treatment time and time spent performing 1:1 timed codes.

## 2021-09-27 DIAGNOSIS — M79.604 PAIN OF RIGHT LOWER EXTREMITY: Primary | ICD-10-CM

## 2021-09-28 ENCOUNTER — ANCILLARY PROCEDURE (OUTPATIENT)
Dept: GENERAL RADIOLOGY | Facility: CLINIC | Age: 31
End: 2021-09-28
Attending: ORTHOPAEDIC SURGERY
Payer: OTHER GOVERNMENT

## 2021-09-28 ENCOUNTER — OFFICE VISIT (OUTPATIENT)
Dept: ORTHOPEDICS | Facility: CLINIC | Age: 31
End: 2021-09-28
Payer: OTHER GOVERNMENT

## 2021-09-28 DIAGNOSIS — M25.561 ARTHRALGIA OF RIGHT KNEE: Primary | ICD-10-CM

## 2021-09-28 DIAGNOSIS — M79.604 PAIN OF RIGHT LOWER EXTREMITY: ICD-10-CM

## 2021-09-28 PROCEDURE — 99212 OFFICE O/P EST SF 10 MIN: CPT | Performed by: ORTHOPAEDIC SURGERY

## 2021-09-28 PROCEDURE — 73590 X-RAY EXAM OF LOWER LEG: CPT | Mod: RT | Performed by: RADIOLOGY

## 2021-09-28 NOTE — PROGRESS NOTES
CHIEF COMPLAINT:  Status post right tibia and fibula derotational osteotomy performed on 05/26/2021.    HISTORY OF PRESENT ILLNESS:  Ms. Lobato presents today for further followup.  She reports to have had some pain and discomfort on the tibia, as she apparently did some twisting and had a fall.  Eventually, she was evaluated locally in [place], but there was a bit of a concern because of the lack of familiarity with the procedure; therefore, she was encouraged to visit with us.    PHYSICAL EXAMINATION:  On today's exam, she presents with a fairly unremarkable exam with full range of motion of the right knee and ankle.  There is no swelling.  There is no redness.  There is no ecchymosis.    IMAGING:  AP and lateral x-rays of the right tibia are obtained today, which are significant for showing some partial consolidation across the osteotomy sites.  Hardware is intact and in place.  Alignment is excellent.  There are no acute findings.    ASSESSMENT:  Status post right tibia and fibula derotational osteotomy.    PLAN:  I discussed with the patient that at this point, I do not have any concerns.  I would suggest to continue advancing her activities as tolerated.  I discussed with her that she is not healing extremely fast, but clearly I believe that she is making progress.  She will resume her Physical Therapy program as soon as she returns home.    We also discussed the importance of having the ability to perform remote visits as long as we have some recent imaging.    The patient will follow up with Dr. Edouard with regard to the sequence of events on having surgery in the right knee versus a derotational osteotomy on the left tibia.    All questions were answered.    TT:  20 minutes.  CT:  15 minutes.

## 2021-09-28 NOTE — NURSING NOTE
Reason For Visit:   Chief Complaint   Patient presents with     RECHECK     pain in right shin area 4 months s/p Right tibia and fibula osteotomy  DOS: 5/26/21       There were no vitals taken for this visit.    Pain Assessment  Patient Currently in Pain: Yes  0-10 Pain Scale: 7    Krishna Pascal, EMT

## 2021-09-28 NOTE — LETTER
9/28/2021         RE: Suzy Lobato  [address]  [place] [address]        Dear Colleague,    Thank you for referring your patient, Suzy Lobato, to the [facility] [place]. Please see a copy of my visit note below.    CHIEF COMPLAINT:  Status post right tibia and fibula derotational osteotomy performed on 05/26/2021.    HISTORY OF PRESENT ILLNESS:  Ms. Lobato presents today for further followup.  She reports to have had some pain and discomfort on the tibia, as she apparently did some twisting and had a fall.  Eventually, she was evaluated locally in [place], but there was a bit of a concern because of the lack of familiarity with the procedure; therefore, she was encouraged to visit with us.    PHYSICAL EXAMINATION:  On today's exam, she presents with a fairly unremarkable exam with full range of motion of the right knee and ankle.  There is no swelling.  There is no redness.  There is no ecchymosis.    IMAGING:  AP and lateral x-rays of the right tibia are obtained today, which are significant for showing some partial consolidation across the osteotomy sites.  Hardware is intact and in place.  Alignment is excellent.  There are no acute findings.    ASSESSMENT:  Status post right tibia and fibula derotational osteotomy.    PLAN:  I discussed with the patient that at this point, I do not have any concerns.  I would suggest to continue advancing her activities as tolerated.  I discussed with her that she is not healing extremely fast, but clearly I believe that she is making progress.  She will resume her Physical Therapy program as soon as she returns home.    We also discussed the importance of having the ability to perform remote visits as long as we have some recent imaging.    The patient will follow up with Dr. Edouard with regard to the sequence of events on having surgery in the right knee versus a derotational osteotomy on the left tibia.    All questions were  answered.    TT:  20 minutes.  CT:  15 minutes.    Again, thank you for allowing me to participate in the care of your patient.        Sincerely,        Ronnie Castle MD

## 2021-10-11 ENCOUNTER — HEALTH MAINTENANCE LETTER (OUTPATIENT)
Age: 31
End: 2021-10-11

## 2021-10-12 ENCOUNTER — MEDICAL CORRESPONDENCE (OUTPATIENT)
Dept: HEALTH INFORMATION MANAGEMENT | Facility: CLINIC | Age: 31
End: 2021-10-12

## 2021-10-12 ENCOUNTER — VIRTUAL VISIT (OUTPATIENT)
Dept: ORTHOPEDICS | Facility: CLINIC | Age: 31
End: 2021-10-12
Payer: OTHER GOVERNMENT

## 2021-10-12 DIAGNOSIS — M21.861 TIBIAL TORSION, RIGHT: Primary | ICD-10-CM

## 2021-10-12 PROCEDURE — 99213 OFFICE O/P EST LOW 20 MIN: CPT | Mod: 95 | Performed by: ORTHOPAEDIC SURGERY

## 2021-10-12 NOTE — LETTER
10/12/2021         RE: Suzy Lobato  [address] [address]        Dear Colleague,    Thank you for referring your patient, Suzy Lobato, to the [facility]. Please see a copy of my visit note below.    Reason For Visit:   Chief Complaint   Patient presents with     Surgical Followup     Virtual through [employer] Discuss right knee surgery and/or left knee surgery Follow up Right Tibia and fibula osteotomy DOS 5/26/21        Primary MD: Dr. Tavares  Ref. MD: Dr. Tavares     ?  No  Occupation .  Currently working? Yes.  Work status?  Full time.     Date of injury: No  Type of injury: No.     Date of surgery & Type:  2000's and 2010   Multiple scopes on both knees and a  Right patella derotation     Date of surgery: 5/26/21  Type of surgery: Right tibia and fibula derotational osteotomy using 24 degrees of internal rotation. With Dr. Castle     Smoker: No  Request smoking cessation information: No    There were no vitals taken for this visit.    Pain Assessment  Patient Currently in Pain: Denies (just a little discomfort on both knees)    Suzy is a 31 year old who is being evaluated via a billable video visit.      How would you like to obtain your AVS? [employer]ngeniahart    Will anyone else be joining your video visit? No        Video-Visit Details    Type of service:  Video Visit    Originating Location (pt. Location): Home    Distant Location (provider location):  [facility]     Platform used for Video Visit: [employer] (audio not working, used [employer] + phone)    Subjective  Patient is a 31-year-old female who is now approximately 4 months status post a right derotation osteotomy of the tibia using an IM noel.  She lives in [place] and is seeing a physical therapist through [place].    She was asked to see me today to discuss her current anterior knee pain.  She feels that her anterior knee pain is  about the same as it was preoperatively, sometimes a little bit worse, and she wonders what the next step should be.  She is asking whether she should pay more attention to the cartilage wear in her right patellofemoral joint versus proceeding on the opposite side with a left derotation osteotomy.    Despite her impressive degree of external tibial rotation on her left side, I would not advocate proceeding with the left side until her right side is better.  On review of her previous MRI I am not impressed that she has any area down to bare bone in the patellofemoral joint, and she has an area at the median ridge that is partial-thickness loss with some fissuring.  I am not convinced that this degree of cartilage wear is the source of her continued anterior knee pain    In addition, both times our physical therapist saw her, both pre and postop, it was felt that she continued to have poor quad contraction and generalized lower extremity weakness.  At the time of our first visit with her, a lateral to medial tape technique was performed with slight reduction in her pain but also a improvement in her squat mechanics and patella tracking as judged grossly.  Lastly on my physical exam she had 1 quadrant of medial translation suggesting the potential for some degree of tightness.    Patient has also had an injection of steroids and an injection of hyaluronic acid into her right knee prior to the surgical procedure, she estimates it was about a year ago, with limited success    I suggested the following approach:  1.  To return to Dr. Miller and or one of his associates to have a injection into her right knee.  I would suggest a steroid injection as if her main source of pain is secondary to cartilage wear, steroid should have the best positive effect on reducing this pain.  2.  Through her communication with Colette, her therapist, I would suggest another trial of tape technique aimed at reducing the lateral contact forces  in patellofemoral joint to see if this has any positive effect on her pain  3.  She will follow-up with us through my chart note to discuss the effects of the above to interventions.  4. Consider a kneehab brace    If she continues to have some relief with a tape technique but no lasting relief with a steroid injection, 1 could consider a arthroscopy plus LRL as a possible intervention for her right knee.    Consultation time 25 minutes    Felecia Edouard MD  Professor Orthopedic Surgery  [facility]

## 2021-10-12 NOTE — PROGRESS NOTES
Reason For Visit:   Chief Complaint   Patient presents with     Surgical Followup     Virtual through [employer] Discuss right knee surgery and/or left knee surgery Follow up Right Tibia and fibula osteotomy DOS 5/26/21        Primary MD: Dr. Tavares  Ref. MD: Dr. Tavares     ?  No  Occupation .  Currently working? Yes.  Work status?  Full time.     Date of injury: No  Type of injury: No.     Date of surgery & Type:  2000's and 2010   Multiple scopes on both knees and a  Right patella derotation     Date of surgery: 5/26/21  Type of surgery: Right tibia and fibula derotational osteotomy using 24 degrees of internal rotation. With Dr. Castle     Smoker: No  Request smoking cessation information: No    There were no vitals taken for this visit.    Pain Assessment  Patient Currently in Pain: Denies (just a little discomfort on both knees)    Suzy is a 31 year old who is being evaluated via a billable video visit.      How would you like to obtain your AVS? MyChart    Will anyone else be joining your video visit? No        Video-Visit Details    Type of service:  Video Visit    Originating Location (pt. Location): Home    Distant Location (provider location):  [facility]     Platform used for Video Visit: "[employer] (audio not working, used [email or web address] + phone)    Subjective  Patient is a 31-year-old female who is now approximately 4 months status post a right derotation osteotomy of the tibia using an IM noel.  She lives in [place] and is seeing a physical therapist through [place].    She was asked to see me today to discuss her current anterior knee pain.  She feels that her anterior knee pain is about the same as it was preoperatively, sometimes a little bit worse, and she wonders what the next step should be.  She is asking whether she should pay more attention to the cartilage wear in her right patellofemoral joint versus proceeding on the opposite side with  a left derotation osteotomy.    Despite her impressive degree of external tibial rotation on her left side, I would not advocate proceeding with the left side until her right side is better.  On review of her previous MRI I am not impressed that she has any area down to bare bone in the patellofemoral joint, and she has an area at the median ridge that is partial-thickness loss with some fissuring.  I am not convinced that this degree of cartilage wear is the source of her continued anterior knee pain    In addition, both times our physical therapist saw her, both pre and postop, it was felt that she continued to have poor quad contraction and generalized lower extremity weakness.  At the time of our first visit with her, a lateral to medial tape technique was performed with slight reduction in her pain but also a improvement in her squat mechanics and patella tracking as judged grossly.  Lastly on my physical exam she had 1 quadrant of medial translation suggesting the potential for some degree of tightness.    Patient has also had an injection of steroids and an injection of hyaluronic acid into her right knee prior to the surgical procedure, she estimates it was about a year ago, with limited success    I suggested the following approach:  1.  To return to Dr. Miller and or one of his associates to have a injection into her right knee.  I would suggest a steroid injection as if her main source of pain is secondary to cartilage wear, steroid should have the best positive effect on reducing this pain.  2.  Through her communication with Colette, her therapist, I would suggest another trial of tape technique aimed at reducing the lateral contact forces in patellofemoral joint to see if this has any positive effect on her pain  3.  She will follow-up with us through my chart note to discuss the effects of the above to interventions.  4. Consider a kneehab brace    If she continues to have some relief with a tape  technique but no lasting relief with a steroid injection, 1 could consider a arthroscopy plus LRL as a possible intervention for her right knee.    Consultation time 25 minutes    Felecia Edouard MD  Professor Orthopedic Surgery  [facility]

## 2021-12-28 ENCOUNTER — DOCUMENTATION ONLY (OUTPATIENT)
Dept: ORTHOPEDICS | Facility: CLINIC | Age: 31
End: 2021-12-28
Payer: OTHER GOVERNMENT

## 2021-12-28 NOTE — PROGRESS NOTES
Order for PT signed and faxed back to [phone]  Sent to be scanned into chart    Grace Verduzco

## 2022-02-14 ENCOUNTER — TRANSFERRED RECORDS (OUTPATIENT)
Dept: HEALTH INFORMATION MANAGEMENT | Facility: CLINIC | Age: 32
End: 2022-02-14
Payer: OTHER GOVERNMENT

## 2022-02-16 ENCOUNTER — TRANSCRIBE ORDERS (OUTPATIENT)
Dept: OTHER | Age: 32
End: 2022-02-16
Payer: OTHER GOVERNMENT

## 2022-02-16 ENCOUNTER — MEDICAL CORRESPONDENCE (OUTPATIENT)
Dept: HEALTH INFORMATION MANAGEMENT | Facility: CLINIC | Age: 32
End: 2022-02-16
Payer: OTHER GOVERNMENT

## 2022-02-16 DIAGNOSIS — M25.571 ACUTE RIGHT ANKLE PAIN: ICD-10-CM

## 2022-02-16 DIAGNOSIS — M79.604 LEG PAIN, ANTERIOR, RIGHT: ICD-10-CM

## 2022-02-16 DIAGNOSIS — M17.12 PRIMARY OSTEOARTHRITIS OF LEFT KNEE: ICD-10-CM

## 2022-02-16 DIAGNOSIS — M17.11 PRIMARY OSTEOARTHRITIS OF RIGHT KNEE: Primary | ICD-10-CM

## 2022-02-17 ENCOUNTER — TELEPHONE (OUTPATIENT)
Dept: ORTHOPEDICS | Facility: CLINIC | Age: 32
End: 2022-02-17
Payer: OTHER GOVERNMENT

## 2022-02-17 ENCOUNTER — TRANSFERRED RECORDS (OUTPATIENT)
Dept: HEALTH INFORMATION MANAGEMENT | Facility: CLINIC | Age: 32
End: 2022-02-17
Payer: OTHER GOVERNMENT

## 2022-02-17 DIAGNOSIS — M21.861 TIBIAL TORSION, RIGHT: Primary | ICD-10-CM

## 2022-02-17 NOTE — TELEPHONE ENCOUNTER
Patient has seen Dr. Castle in the past.  She lives in [place] and was told by Dr. Castle that their next visit could be a virtual.  I tried to schedule patient for a virtual visit, but was unable to.  Could someone reach out to patient to either schedule a virtual visit or let her know that Dr. Castle would prefer an in person?     Thank you    Amanda Mayer

## 2022-03-01 ENCOUNTER — TELEPHONE (OUTPATIENT)
Dept: ORTHOPEDICS | Facility: CLINIC | Age: 32
End: 2022-03-01
Payer: OTHER GOVERNMENT

## 2022-03-01 NOTE — TELEPHONE ENCOUNTER
Called patient back to gather information, patient reports to have taken a fall yesterday and visited her local ER in [place]. Writer has called and requested the MRI of her knee be pushed into our system and will have Dr. Castle review and advise further. Patient was agreeable with this plan and thanked the caller.    Krishna Pascal, EMT on 3/1/2022 at 8:49 AM

## 2022-03-15 ENCOUNTER — VIRTUAL VISIT (OUTPATIENT)
Dept: ORTHOPEDICS | Facility: CLINIC | Age: 32
End: 2022-03-15
Payer: OTHER GOVERNMENT

## 2022-03-15 DIAGNOSIS — M17.12 PRIMARY OSTEOARTHRITIS OF LEFT KNEE: ICD-10-CM

## 2022-03-15 DIAGNOSIS — M79.604 LEG PAIN, ANTERIOR, RIGHT: ICD-10-CM

## 2022-03-15 DIAGNOSIS — M25.571 ACUTE RIGHT ANKLE PAIN: ICD-10-CM

## 2022-03-15 DIAGNOSIS — M17.11 PRIMARY OSTEOARTHRITIS OF RIGHT KNEE: ICD-10-CM

## 2022-03-15 PROCEDURE — 99213 OFFICE O/P EST LOW 20 MIN: CPT | Mod: 95 | Performed by: ORTHOPAEDIC SURGERY

## 2022-03-15 NOTE — PROGRESS NOTES
CHIEF COMPLAINT:  Status post right tibia and fibula derotational osteotomy performed on 05/26/2021.    HISTORY OF PRESENT ILLNESS:  Ms. Lobato was interviewed today on the phone secondary to the pandemic.  The patient authorized the encounter.    She reports to continue having pain and discomfort at the level of the osteotomy site for the tibia.  Reports to be making some progress, but clearly she is somewhat frustrated because she would expect to be further down the road than where she is today.    IMAGING:  Plain x-rays from 02/2022 were reviewed today, which were significant for showing a solid fusion through the posterior cortex of the tibia with a solid callus; however, she continues having some radiolucency along the anterior cortex of the tibia.    ASSESSMENT:  Status post right tibia and fibula derotational osteotomies.    PLAN:  I discussed with the patient that, in fact, we were a bit surprised to see the amount of healing that she presents this far out.    We are going to proceed with an evaluation, which hopefully can be performed remotely by Dr. Melvina Millan for bone metabolism and bone health.  We will also have another followup appointment with her remotely with a phone encounter in 2 months from now.  Plain x-rays will be required prior to that appointment.    I encouraged the patient to proceed with further stimulus and stressing bone on and off in order to stimulate bone healing as well.    All questions were answered.  The patient was pleased with discussion.    TT:  20 minutes.  CT:  15 minutes.

## 2022-03-15 NOTE — LETTER
3/15/2022     RE: Suzy Lobato  [address]  [place] [address]    Dear Colleague,    Thank you for referring your patient, Suzy Lobato, to the [facility] [place]. Please see a copy of my visit note below.    CHIEF COMPLAINT:  Status post right tibia and fibula derotational osteotomy performed on 05/26/2021.    HISTORY OF PRESENT ILLNESS:  Ms. Lobato was interviewed today on the phone secondary to the pandemic.  The patient authorized the encounter.    She reports to continue having pain and discomfort at the level of the osteotomy site for the tibia.  Reports to be making some progress, but clearly she is somewhat frustrated because she would expect to be further down the road than where she is today.    IMAGING:  Plain x-rays from 02/2022 were reviewed today, which were significant for showing a solid fusion through the posterior cortex of the tibia with a solid callus; however, she continues having some radiolucency along the anterior cortex of the tibia.    ASSESSMENT:  Status post right tibia and fibula derotational osteotomies.    PLAN:  I discussed with the patient that, in fact, we were a bit surprised to see the amount of healing that she presents this far out.    We are going to proceed with an evaluation, which hopefully can be performed remotely by Dr. Melvina Millan for bone metabolism and bone health.  We will also have another followup appointment with her remotely with a phone encounter in 2 months from now.  Plain x-rays will be required prior to that appointment.    I encouraged the patient to proceed with further stimulus and stressing bone on and off in order to stimulate bone healing as well.    All questions were answered.  The patient was pleased with discussion.    TT:  20 minutes.  CT:  15 minutes.      Ronnie Castle MD

## 2022-03-16 DIAGNOSIS — M79.604 PAIN OF RIGHT LOWER EXTREMITY: Primary | ICD-10-CM

## 2022-03-17 NOTE — TELEPHONE ENCOUNTER
DIAGNOSIS: Bone health referral Dr. Castle    APPOINTMENT DATE: 4.6.22   NOTES STATUS DETAILS   OFFICE NOTE from referring provider Internal 3.15.22 Dr Ronnie Castle, [facility]  More in [facility] Everywhere   MEDICATION LIST Internal    XRAYS (IMAGES & REPORTS) PACS Images in PACS

## 2022-04-06 ENCOUNTER — PRE VISIT (OUTPATIENT)
Dept: ORTHOPEDICS | Facility: CLINIC | Age: 32
End: 2022-04-06
Payer: OTHER GOVERNMENT

## 2022-05-11 DIAGNOSIS — M21.861 TIBIAL TORSION, RIGHT: Primary | ICD-10-CM

## 2022-05-17 ENCOUNTER — APPOINTMENT (OUTPATIENT)
Dept: ORTHOPEDICS | Facility: CLINIC | Age: 32
End: 2022-05-17
Payer: OTHER GOVERNMENT

## 2022-05-22 ENCOUNTER — HEALTH MAINTENANCE LETTER (OUTPATIENT)
Age: 32
End: 2022-05-22

## 2022-05-24 ENCOUNTER — VIRTUAL VISIT (OUTPATIENT)
Dept: ORTHOPEDICS | Facility: CLINIC | Age: 32
End: 2022-05-24
Payer: OTHER GOVERNMENT

## 2022-05-24 ENCOUNTER — MYC MEDICAL ADVICE (OUTPATIENT)
Dept: ORTHOPEDICS | Facility: CLINIC | Age: 32
End: 2022-05-24

## 2022-05-24 DIAGNOSIS — M25.571 PAIN IN JOINT, ANKLE AND FOOT, RIGHT: Primary | ICD-10-CM

## 2022-05-24 PROCEDURE — 99213 OFFICE O/P EST LOW 20 MIN: CPT | Mod: 95 | Performed by: ORTHOPAEDIC SURGERY

## 2022-05-24 NOTE — PROGRESS NOTES
TELEPHONE VISIT    CHIEF COMPLAINT:  Status post right tibia and fibula derotational osteotomy performed on 05/26/2021.    HISTORY OF PRESENT ILLNESS:  Ms. Lobato was interviewed via phone secondary to the pandemic.  The patient authorized the encounter.    The patient reports to continue having problems and difficulties with the distal locking screw on the right ankle.  Reports to have an interest in getting this removed.  Reports otherwise to be feeling quite well with regards to the tibia itself and the knee.  Unfortunately, recently she has had a patellar fracture, which had set her back a little bit.    ASSESSMENT:  Painful retained hardware, right ankle.    PLAN:  I discussed with the patient the most likely postoperative course and complications from undergoing hardware removal, which include but are not limited to infection, bleeding, nerve damage, residual pain and stiffness.    All questions were answered.  Patient was pleased with the discussion.  The patient will get surgery scheduled at the best of her convenience.  He desire is to get it done sometime in 06/2022 prior to her move.  In the meantime, she has no activity restrictions.    All questions were answered.    TT:  20 minutes.  CT:  15 minutes.

## 2022-05-24 NOTE — LETTER
5/24/2022         RE: Suzy Lobato  [address]  [place] [address]        Dear Colleague,    Thank you for referring your patient, Suzy Lobato, to the [facility] [place]. Please see a copy of my visit note below.    TELEPHONE VISIT    CHIEF COMPLAINT:  Status post right tibia and fibula derotational osteotomy performed on 05/26/2021.    HISTORY OF PRESENT ILLNESS:  Ms. Lobato was interviewed via phone secondary to the pandemic.  The patient authorized the encounter.    The patient reports to continue having problems and difficulties with the distal locking screw on the right ankle.  Reports to have an interest in getting this removed.  Reports otherwise to be feeling quite well with regards to the tibia itself and the knee.  Unfortunately, recently she has had a patellar fracture, which had set her back a little bit.    ASSESSMENT:  Painful retained hardware, right ankle.    PLAN:  I discussed with the patient the most likely postoperative course and complications from undergoing hardware removal, which include but are not limited to infection, bleeding, nerve damage, residual pain and stiffness.    All questions were answered.  Patient was pleased with the discussion.  The patient will get surgery scheduled at the best of her convenience.  He desire is to get it done sometime in 06/2022 prior to her move.  In the meantime, she has no activity restrictions.    All questions were answered.    TT:  20 minutes.  CT:  15 minutes.      Ronnie Castle MD

## 2022-05-31 ENCOUNTER — TELEPHONE (OUTPATIENT)
Dept: ORTHOPEDICS | Facility: CLINIC | Age: 32
End: 2022-05-31
Payer: OTHER GOVERNMENT

## 2022-05-31 NOTE — TELEPHONE ENCOUNTER
[facility] Call Center    Phone Message:  Essie from [facility] in [place], [place] called, requesting for this team to FAX the XR order of Tibia and Fibula, RIGHT 2 views.    FAX:  [phone]   ATTN: ESSIE    May a detailed message be left on voicemail: N/A     Reason for Call: Other: .  FAX:  XR order of Tibia and Fibula, RIGHT 2 views    Action Taken: Message routed to:  [facility] ([facility] Team    Travel Screening: Not Applicable

## 2022-06-13 ENCOUNTER — TELEPHONE (OUTPATIENT)
Dept: ORTHOPEDICS | Facility: CLINIC | Age: 32
End: 2022-06-13
Payer: OTHER GOVERNMENT

## 2022-06-13 PROBLEM — M25.571 PAIN IN JOINT, ANKLE AND FOOT, RIGHT: Status: ACTIVE | Noted: 2022-06-13

## 2022-07-06 ENCOUNTER — MEDICAL CORRESPONDENCE (OUTPATIENT)
Dept: HEALTH INFORMATION MANAGEMENT | Facility: CLINIC | Age: 32
End: 2022-07-06

## 2022-07-12 ENCOUNTER — ANESTHESIA EVENT (OUTPATIENT)
Dept: SURGERY | Facility: AMBULATORY SURGERY CENTER | Age: 32
End: 2022-07-12
Payer: OTHER GOVERNMENT

## 2022-07-13 ENCOUNTER — ANESTHESIA (OUTPATIENT)
Dept: SURGERY | Facility: AMBULATORY SURGERY CENTER | Age: 32
End: 2022-07-13
Payer: OTHER GOVERNMENT

## 2022-07-13 ENCOUNTER — HOSPITAL ENCOUNTER (OUTPATIENT)
Facility: AMBULATORY SURGERY CENTER | Age: 32
Discharge: HOME | End: 2022-07-13
Attending: ORTHOPAEDIC SURGERY
Payer: OTHER GOVERNMENT

## 2022-07-13 VITALS
RESPIRATION RATE: 18 BRPM | OXYGEN SATURATION: 96 % | WEIGHT: 290 LBS | DIASTOLIC BLOOD PRESSURE: 71 MMHG | SYSTOLIC BLOOD PRESSURE: 121 MMHG | TEMPERATURE: 98 F | HEART RATE: 85 BPM | BODY MASS INDEX: 39.28 KG/M2 | HEIGHT: 72 IN

## 2022-07-13 DIAGNOSIS — M25.571 PAIN IN JOINT, ANKLE AND FOOT, RIGHT: ICD-10-CM

## 2022-07-13 LAB
HCG UR QL: NEGATIVE
INTERNAL QC OK POCT: NORMAL
POCT KIT EXPIRATION DATE: NORMAL
POCT KIT LOT NUMBER: NORMAL

## 2022-07-13 PROCEDURE — 20680 REMOVAL OF IMPLANT DEEP: CPT | Mod: RT | Performed by: ORTHOPAEDIC SURGERY

## 2022-07-13 PROCEDURE — 20680 REMOVAL OF IMPLANT DEEP: CPT | Mod: RT

## 2022-07-13 PROCEDURE — 81025 URINE PREGNANCY TEST: CPT | Performed by: PATHOLOGY

## 2022-07-13 RX ORDER — SODIUM CHLORIDE, SODIUM LACTATE, POTASSIUM CHLORIDE, CALCIUM CHLORIDE 600; 310; 30; 20 MG/100ML; MG/100ML; MG/100ML; MG/100ML
INJECTION, SOLUTION INTRAVENOUS CONTINUOUS PRN
Status: DISCONTINUED | OUTPATIENT
Start: 2022-07-13 | End: 2022-07-13

## 2022-07-13 RX ORDER — OXYCODONE HYDROCHLORIDE 5 MG/1
5 TABLET ORAL EVERY 4 HOURS PRN
Status: DISCONTINUED | OUTPATIENT
Start: 2022-07-13 | End: 2022-07-14 | Stop reason: HOSPADM

## 2022-07-13 RX ORDER — ONDANSETRON 2 MG/ML
INJECTION INTRAMUSCULAR; INTRAVENOUS PRN
Status: DISCONTINUED | OUTPATIENT
Start: 2022-07-13 | End: 2022-07-13

## 2022-07-13 RX ORDER — SODIUM CHLORIDE, SODIUM LACTATE, POTASSIUM CHLORIDE, CALCIUM CHLORIDE 600; 310; 30; 20 MG/100ML; MG/100ML; MG/100ML; MG/100ML
INJECTION, SOLUTION INTRAVENOUS CONTINUOUS
Status: DISCONTINUED | OUTPATIENT
Start: 2022-07-13 | End: 2022-07-14 | Stop reason: HOSPADM

## 2022-07-13 RX ORDER — HYDROXYZINE HYDROCHLORIDE 25 MG/1
25 TABLET, FILM COATED ORAL
Status: DISCONTINUED | OUTPATIENT
Start: 2022-07-13 | End: 2022-07-14 | Stop reason: HOSPADM

## 2022-07-13 RX ORDER — BUPIVACAINE HYDROCHLORIDE 5 MG/ML
INJECTION, SOLUTION PERINEURAL PRN
Status: DISCONTINUED | OUTPATIENT
Start: 2022-07-13 | End: 2022-07-13 | Stop reason: HOSPADM

## 2022-07-13 RX ORDER — SCOLOPAMINE TRANSDERMAL SYSTEM 1 MG/1
1 PATCH, EXTENDED RELEASE TRANSDERMAL ONCE
Status: DISCONTINUED | OUTPATIENT
Start: 2022-07-13 | End: 2022-07-14 | Stop reason: HOSPADM

## 2022-07-13 RX ORDER — ACETAMINOPHEN 325 MG/1
975 TABLET ORAL ONCE
Status: COMPLETED | OUTPATIENT
Start: 2022-07-13 | End: 2022-07-13

## 2022-07-13 RX ORDER — CEFAZOLIN SODIUM IN 0.9 % NACL 3 G/100 ML
3 INTRAVENOUS SOLUTION, PIGGYBACK (ML) INTRAVENOUS
Status: COMPLETED | OUTPATIENT
Start: 2022-07-13 | End: 2022-07-13

## 2022-07-13 RX ORDER — HYDROCODONE BITARTRATE AND ACETAMINOPHEN 5; 325 MG/1; MG/1
1-2 TABLET ORAL EVERY 4 HOURS PRN
Qty: 8 TABLET | Refills: 0 | Status: SHIPPED | OUTPATIENT
Start: 2022-07-13

## 2022-07-13 RX ORDER — KETOROLAC TROMETHAMINE 30 MG/ML
INJECTION, SOLUTION INTRAMUSCULAR; INTRAVENOUS PRN
Status: DISCONTINUED | OUTPATIENT
Start: 2022-07-13 | End: 2022-07-13

## 2022-07-13 RX ORDER — LIDOCAINE HYDROCHLORIDE 20 MG/ML
INJECTION, SOLUTION INFILTRATION; PERINEURAL PRN
Status: DISCONTINUED | OUTPATIENT
Start: 2022-07-13 | End: 2022-07-13

## 2022-07-13 RX ORDER — ONDANSETRON 4 MG/1
4 TABLET, ORALLY DISINTEGRATING ORAL EVERY 30 MIN PRN
Status: DISCONTINUED | OUTPATIENT
Start: 2022-07-13 | End: 2022-07-14 | Stop reason: HOSPADM

## 2022-07-13 RX ORDER — SODIUM CHLORIDE, SODIUM LACTATE, POTASSIUM CHLORIDE, CALCIUM CHLORIDE 600; 310; 30; 20 MG/100ML; MG/100ML; MG/100ML; MG/100ML
INJECTION, SOLUTION INTRAVENOUS CONTINUOUS
Status: DISCONTINUED | OUTPATIENT
Start: 2022-07-13 | End: 2022-07-13 | Stop reason: HOSPADM

## 2022-07-13 RX ORDER — GLYCOPYRROLATE 0.2 MG/ML
INJECTION, SOLUTION INTRAMUSCULAR; INTRAVENOUS PRN
Status: DISCONTINUED | OUTPATIENT
Start: 2022-07-13 | End: 2022-07-13

## 2022-07-13 RX ORDER — ALBUTEROL SULFATE 0.83 MG/ML
2.5 SOLUTION RESPIRATORY (INHALATION) EVERY 4 HOURS PRN
Status: DISCONTINUED | OUTPATIENT
Start: 2022-07-13 | End: 2022-07-13 | Stop reason: HOSPADM

## 2022-07-13 RX ORDER — HYDROMORPHONE HYDROCHLORIDE 1 MG/ML
0.2 INJECTION, SOLUTION INTRAMUSCULAR; INTRAVENOUS; SUBCUTANEOUS EVERY 5 MIN PRN
Status: DISCONTINUED | OUTPATIENT
Start: 2022-07-13 | End: 2022-07-13 | Stop reason: HOSPADM

## 2022-07-13 RX ORDER — FENTANYL CITRATE 50 UG/ML
INJECTION, SOLUTION INTRAMUSCULAR; INTRAVENOUS PRN
Status: DISCONTINUED | OUTPATIENT
Start: 2022-07-13 | End: 2022-07-13

## 2022-07-13 RX ORDER — ONDANSETRON 2 MG/ML
4 INJECTION INTRAMUSCULAR; INTRAVENOUS EVERY 30 MIN PRN
Status: DISCONTINUED | OUTPATIENT
Start: 2022-07-13 | End: 2022-07-14 | Stop reason: HOSPADM

## 2022-07-13 RX ORDER — HYDROXYZINE HYDROCHLORIDE 25 MG/1
25 TABLET, FILM COATED ORAL EVERY 8 HOURS PRN
Qty: 8 TABLET | Refills: 0 | Status: SHIPPED | OUTPATIENT
Start: 2022-07-13

## 2022-07-13 RX ORDER — FENTANYL CITRATE 50 UG/ML
25 INJECTION, SOLUTION INTRAMUSCULAR; INTRAVENOUS
Status: DISCONTINUED | OUTPATIENT
Start: 2022-07-13 | End: 2022-07-14 | Stop reason: HOSPADM

## 2022-07-13 RX ORDER — MEPERIDINE HYDROCHLORIDE 25 MG/ML
12.5 INJECTION INTRAMUSCULAR; INTRAVENOUS; SUBCUTANEOUS
Status: DISCONTINUED | OUTPATIENT
Start: 2022-07-13 | End: 2022-07-14 | Stop reason: HOSPADM

## 2022-07-13 RX ORDER — DIAZEPAM 10 MG/2ML
2.5 INJECTION, SOLUTION INTRAMUSCULAR; INTRAVENOUS
Status: DISCONTINUED | OUTPATIENT
Start: 2022-07-13 | End: 2022-07-13 | Stop reason: HOSPADM

## 2022-07-13 RX ORDER — LIDOCAINE 40 MG/G
CREAM TOPICAL
Status: DISCONTINUED | OUTPATIENT
Start: 2022-07-13 | End: 2022-07-13 | Stop reason: HOSPADM

## 2022-07-13 RX ORDER — DEXAMETHASONE SODIUM PHOSPHATE 10 MG/ML
4 INJECTION, SOLUTION INTRAMUSCULAR; INTRAVENOUS EVERY 10 MIN PRN
Status: DISCONTINUED | OUTPATIENT
Start: 2022-07-13 | End: 2022-07-14 | Stop reason: HOSPADM

## 2022-07-13 RX ORDER — DEXAMETHASONE SODIUM PHOSPHATE 4 MG/ML
INJECTION, SOLUTION INTRA-ARTICULAR; INTRALESIONAL; INTRAMUSCULAR; INTRAVENOUS; SOFT TISSUE PRN
Status: DISCONTINUED | OUTPATIENT
Start: 2022-07-13 | End: 2022-07-13

## 2022-07-13 RX ORDER — PROPOFOL 10 MG/ML
INJECTION, EMULSION INTRAVENOUS CONTINUOUS PRN
Status: DISCONTINUED | OUTPATIENT
Start: 2022-07-13 | End: 2022-07-13

## 2022-07-13 RX ORDER — HYDROCODONE BITARTRATE AND ACETAMINOPHEN 5; 325 MG/1; MG/1
1 TABLET ORAL
Status: DISCONTINUED | OUTPATIENT
Start: 2022-07-13 | End: 2022-07-14 | Stop reason: HOSPADM

## 2022-07-13 RX ORDER — CEFAZOLIN SODIUM IN 0.9 % NACL 3 G/100 ML
3 INTRAVENOUS SOLUTION, PIGGYBACK (ML) INTRAVENOUS SEE ADMIN INSTRUCTIONS
Status: DISCONTINUED | OUTPATIENT
Start: 2022-07-13 | End: 2022-07-13 | Stop reason: HOSPADM

## 2022-07-13 RX ORDER — FENTANYL CITRATE 50 UG/ML
25 INJECTION, SOLUTION INTRAMUSCULAR; INTRAVENOUS EVERY 5 MIN PRN
Status: DISCONTINUED | OUTPATIENT
Start: 2022-07-13 | End: 2022-07-13 | Stop reason: HOSPADM

## 2022-07-13 RX ORDER — PROPOFOL 10 MG/ML
INJECTION, EMULSION INTRAVENOUS PRN
Status: DISCONTINUED | OUTPATIENT
Start: 2022-07-13 | End: 2022-07-13

## 2022-07-13 RX ADMIN — Medication 3 G: at 10:07

## 2022-07-13 RX ADMIN — LIDOCAINE HYDROCHLORIDE 100 MG: 20 INJECTION, SOLUTION INFILTRATION; PERINEURAL at 10:14

## 2022-07-13 RX ADMIN — PROPOFOL 200 MG: 10 INJECTION, EMULSION INTRAVENOUS at 10:15

## 2022-07-13 RX ADMIN — ACETAMINOPHEN 975 MG: 325 TABLET ORAL at 10:00

## 2022-07-13 RX ADMIN — PROPOFOL 200 MCG/KG/MIN: 10 INJECTION, EMULSION INTRAVENOUS at 10:15

## 2022-07-13 RX ADMIN — FENTANYL CITRATE 100 MCG: 50 INJECTION, SOLUTION INTRAMUSCULAR; INTRAVENOUS at 10:10

## 2022-07-13 RX ADMIN — SODIUM CHLORIDE, SODIUM LACTATE, POTASSIUM CHLORIDE, CALCIUM CHLORIDE: 600; 310; 30; 20 INJECTION, SOLUTION INTRAVENOUS at 10:07

## 2022-07-13 RX ADMIN — OXYCODONE HYDROCHLORIDE 5 MG: 5 TABLET ORAL at 10:51

## 2022-07-13 RX ADMIN — Medication 0.5 MG: at 10:23

## 2022-07-13 RX ADMIN — DEXAMETHASONE SODIUM PHOSPHATE 4 MG: 4 INJECTION, SOLUTION INTRA-ARTICULAR; INTRALESIONAL; INTRAMUSCULAR; INTRAVENOUS; SOFT TISSUE at 10:10

## 2022-07-13 RX ADMIN — KETOROLAC TROMETHAMINE 30 MG: 30 INJECTION, SOLUTION INTRAMUSCULAR; INTRAVENOUS at 10:10

## 2022-07-13 RX ADMIN — ONDANSETRON 4 MG: 2 INJECTION INTRAMUSCULAR; INTRAVENOUS at 10:10

## 2022-07-13 RX ADMIN — GLYCOPYRROLATE 0.2 MG: 0.2 INJECTION, SOLUTION INTRAMUSCULAR; INTRAVENOUS at 10:10

## 2022-07-13 RX ADMIN — SCOLOPAMINE TRANSDERMAL SYSTEM 1 PATCH: 1 PATCH, EXTENDED RELEASE TRANSDERMAL at 10:06

## 2022-07-13 NOTE — ANESTHESIA CARE TRANSFER NOTE
Patient: Szuy Lobato    Procedure: Procedure(s):  right ankle hardware removal       Diagnosis: Pain in joint, ankle and foot, right [M25.571]  Diagnosis Additional Information: No value filed.    Anesthesia Type:   General     Note:    Oropharynx: oropharynx clear of all foreign objects and spontaneously breathing  Level of Consciousness: drowsy  Oxygen Supplementation: nasal cannula  Level of Supplemental Oxygen (L/min / FiO2): 3  Independent Airway: airway patency satisfactory and stable  Dentition: dentition unchanged  Vital Signs Stable: post-procedure vital signs reviewed and stable  Report to RN Given: handoff report given  Patient transferred to: PACU    Handoff Report: Identifed the Patient, Identified the Reponsible Provider, Reviewed the pertinent medical history, Discussed the surgical course, Reviewed Intra-OP anesthesia mangement and issues during anesthesia, Set expectations for post-procedure period and Allowed opportunity for questions and acknowledgement of understanding      Vitals:  Vitals Value Taken Time   BP     Temp     Pulse     Resp     SpO2         Electronically Signed By: ANASTASIYA Schneider CRNA  July 13, 2022  10:32 AM

## 2022-07-13 NOTE — ANESTHESIA PREPROCEDURE EVALUATION
Anesthesia Pre-Procedure Evaluation    Patient: Suzy Lobato   MRN: [id number] : 1990        Procedure : Procedure(s):  right ankle hardware removal          Past Medical History:   Diagnosis Date     Complication of anesthesia     PONV      Past Surgical History:   Procedure Laterality Date     OPEN REDUCTION INTERNAL FIXATION RODDING INTRAMEDULLARY TIBIA Right 2021    Procedure: Right tibia and fibula osteotomy;  Surgeon: Ronnie Castle MD;  Location: [facility] OR      No Known Allergies   Social History     Tobacco Use     Smoking status: Never Smoker     Smokeless tobacco: Never Used   Substance Use Topics     Alcohol use: Yes     Comment: a few drinks per year      Wt Readings from Last 1 Encounters:   22 131.5 kg (290 lb)        Anesthesia Evaluation            ROS/MED HX  ENT/Pulmonary:  - neg pulmonary ROS     Neurologic:  - neg neurologic ROS     Cardiovascular:  - neg cardiovascular ROS     METS/Exercise Tolerance:     Hematologic:  - neg hematologic  ROS     Musculoskeletal:  - neg musculoskeletal ROS     GI/Hepatic:  - neg GI/hepatic ROS     Renal/Genitourinary:  - neg Renal ROS     Endo:  - neg endo ROS     Psychiatric/Substance Use:  - neg psychiatric ROS     Infectious Disease:  - neg infectious disease ROS     Malignancy:  - neg malignancy ROS     Other:               OUTSIDE LABS:  CBC: No results found for: WBC, HGB, HCT, PLT  BMP: No results found for: NA, POTASSIUM, CHLORIDE, CO2, BUN, CR, GLC  COAGS: No results found for: PTT, INR, FIBR  POC:   Lab Results   Component Value Date     (H) 2021    HCG Negative 2021     HEPATIC: No results found for: ALBUMIN, PROTTOTAL, ALT, AST, GGT, ALKPHOS, BILITOTAL, BILIDIRECT, OCTAVIO  OTHER: No results found for: PH, LACT, A1C, EBONI, PHOS, MAG, LIPASE, AMYLASE, TSH, T4, T3, CRP, SED    Anesthesia Plan    ASA Status:  1      Anesthesia Type: General.     - Airway: LMA              Consents    Anesthesia Plan(s) and  associated risks, benefits, and realistic alternatives discussed. Questions answered and patient/representative(s) expressed understanding.     - Discussed: Risks, Benefits and Alternatives for BOTH SEDATION and the PROCEDURE were discussed     - Discussed with:  Patient      - Extended Intubation/Ventilatory Support Discussed: No.      - Patient is DNR/DNI Status: No         Postoperative Care    Pain management: IV analgesics, Oral pain medications.   PONV prophylaxis: Ondansetron (or other 5HT-3), Scopolamine patch, Dexamethasone or Solumedrol     Comments:                Travon Thurston MD, MD

## 2022-07-13 NOTE — ANESTHESIA POSTPROCEDURE EVALUATION
Patient: Suzy Lobato    Procedure: Procedure(s):  right ankle hardware removal       Anesthesia Type:  General    Note:  Disposition: Outpatient   Postop Pain Control: Uneventful            Sign Out: Well controlled pain   PONV:    Neuro/Psych: Uneventful            Sign Out: Acceptable/Baseline neuro status   Airway/Respiratory: Uneventful            Sign Out: Acceptable/Baseline resp. status   CV/Hemodynamics: Uneventful            Sign Out: Acceptable CV status; No obvious hypovolemia; No obvious fluid overload   Other NRE: NONE   DID A NON-ROUTINE EVENT OCCUR? No           Last vitals:  Vitals Value Taken Time   /71 07/13/22 1057   Temp 36.6  C (97.8  F) 07/13/22 1057   Pulse 78 07/13/22 1058   Resp 21 07/13/22 1058   SpO2 94 % 07/13/22 1058   Vitals shown include unvalidated device data.    Electronically Signed By: Travon Thurston MD, MD  July 13, 2022  2:06 PM

## 2022-07-13 NOTE — DISCHARGE INSTRUCTIONS
"[facility]  Home Care Following Anesthesia  For 24 hours after surgery:  Get plenty of rest.  A responsible adult must stay with you for at least 24 hours after you leave the surgery center.  Do not drive or use heavy equipment.  If you have weakness or tingling, don't drive or use heavy equipment until this feeling goes away.   Do not drink alcohol.   Avoid strenuous or risky activities.  Ask for help when climbing stairs.  You may feel lightheaded.  IF so, sit for a few minutes before standing.  Have someone help you get up.   If you have nausea (feel sick to your stomach): Drink only clear liquids such as apple juice, ginger ale, broth or 7-Up.  Rest may also help.  Be sure to drink enough fluids.  Move to a regular diet as you feel able.   You may have a slight fever.  Call the doctor if your fever is over 100 F (37.7 C) (taken under the tongue) or lasts longer than 24 hours.  You may have a dry mouth, a sore throat, muscle aches or trouble sleeping. These should go away after 24 hours.  Do not make important or legal decisions.   It is recommended to avoid smoking.        Today you received a Marcaine or bupivacaine block to numb the nerves near your surgery site.  This is a block using local anesthetic or \"numbing\" medication injected around the nerves to anesthetize or \"numb\" the area supplied by those nerves.  This block is injected into the muscle layer near your surgical site.  The medication may numb the location where you had surgery for 6-18 hours, but may last up to 24 hours.  If your surgical site is an arm or leg you should be careful with your affected limb, since it is possible to injure your limb without being aware of it due to the numbing.  Until full feeling returns, you should guard against bumping or hitting your limb, and avoid extreme hot or cold temperatures on the skin.  As the block wears off, the feeling will return as a tingling or prickly " sensation near your surgical site.  You will experience more discomfort from your incision as the feeling returns.  You may want to take a pain pill (a narcotic or Tylenol if this was prescribed by your surgeon) when you start to experience mild pain before the pain beccomes more severe.  If your pain medications do not control your pain you should notifiy your surgeon.    Tips for taking pain medications  To get the best pain relief possible, remember these points:  Take pain medications as directed, before pain becomes severe.  Pain medication can upset your stomach: taking it with food may help.  Constipation is a common side effect of pain medication. Drink plenty of  fluids.  Eat foods high in fiber. Take a stool softener if recommended by your doctor or pharmacist.  Do not drink alcohol, drive or operate machinery while taking pain medications.  Ask about other ways to control pain, such as with heat, ice or relaxation.    Tylenol/Acetaminophen Consumption  To help encourage the safe use of acetaminophen, the makers of TYLENOL  have lowered the maximum daily dose for single-ingredient Extra Strength TYLENOL  (acetaminophen) products sold in the [place] from 8 pills per day (4,000 mg) to 6 pills per day (3,000 mg). The dosing interval has also changed from 2 pills every 4-6 hours to 2 pills every 6 hours.  If you feel your pain relief is insufficient, you may take Tylenol/Acetaminophen in addition to your narcotic pain medication.   Be careful not to exceed 3,000 mg of Tylenol/Acetaminophen in a 24 hour period from all sources.  If you are taking extra strength Tylenol/acetaminophen (500 mg), the maximum dose is 6 tablets in 24 hours.  If you are taking regular strength acetaminophen (325 mg), the maximum dose is 9 tablets in 24 hours.  Tylenol 975mg given at 10am. Next dose available after 4pm. Then follow package instructions.      Call a doctor for any of the following:  Signs of infection (fever, growing  tenderness at the surgery site, a large amount of drainage or bleeding, severe pain, foul-smelling drainage, redness, swelling).  It has been over 8 to 10 hours since surgery and you are still not able to urinate (pass water).  Headache for over 24 hours.  Numbness, tingling or weakness the day after surgery (if you had spinal anesthesia).  Signs of Covid-19 infection (temperature over 100 degrees, shortness of breath, cough, loss of taste/smell, generalized body aches, persistent headache, chills, sore throat, nausea/vomiting/diarrhea)  Your doctor is:       Dr. Ronnie Castle, Orthopaedics: [phone]               Or dial [phone] and ask for the resident on call for:  Orthopaedics  For emergency care, call the:  [facility]: [phone] (TTY for hearing impaired: [phone])    Scopolamine Patch- (Absorbed through the skin)    This medicine prevents nausea and vomiting caused by motion sickness or anesthesia.  The medicine is in a patch worn behind the ear.      Do NOT use the Scopolamine Patch if you have glaucoma or are allergic to scopolamine.    How to Use This Medicine:  The patch is applied behind the ear.  Keep the patch dry to prevent it from falling off.  Limit contact with water (no bathing or swimming).    If the patch is loose or falls off throw it away.  You do not need to apply a new patch.  After you take off the patch or if it falls off, wash your hands and the area behind your ear with soap and water.    You can remove the patch tomorrow, or leave on for up to 3 days.  Only one patch should be used at any time.    How to Dispose of This Medicine:  Fold the used patch in half with the sticky sides together. Throw any used patch away so that children or pets cannot get to it. You will also need to throw away old patches after the expiration date has passed.  Keep all medicine away from children and never share your medicine with anyone.    Warnings While Using This  Medicine:  This medicine can make you sleepy.  Avoid taking sleeping pills and other medicines that can make you sleepy while the patch is on.  Do not drink alcohol while the patch is on.  This medicine can cause temporary blurring and other vision problems if it comes in contact with the eyes.  This is not serious unless accompanied by eye pain and redness.   This medicine may cause problems with urination. If you have problems with urinating, remove the patch.  If you are unable to urinate, call your doctor.    This medicine may make you dizzy or drowsy. Avoid driving, using machines, or doing anything else that could be dangerous if the patch is on.  This medicine may make you sweat less and cause your body to get too hot. Be careful in hot weather or if you are exercising.  Make sure any doctor or dentist who treats you knows that you have the patch on. This medicine may affect the results of certain medical tests.  Skin burns have been reported at the patch site in several patients wearing an aluminized transdermal system during a magnetic resonance imaging scan (MRI).  Since this patch contains aluminum, it is recommended to remove the patch if you are having an MRI.    Possible Side Effects While Using This Medicine:  Dry mouth  Drowsiness  Temporary blurring of vision and widening of the pupils    Call your doctor right away if you notice any of these side effects:  Allergic reaction: Itching or hives, swelling in your face or hands, swelling or tingling in your mouth or throat, chest tightness, trouble breathing.  Blurred vision that does not go away after the patch is removed  Confusion or memory loss  Fast,slow, or uneven heartbeat  Lightheadedness, dizziness, drowsiness, or fainting  Seeing, hearing, or feeling things that are not there  Restlessness  Severe eye pain  Trouble urinating    If you notice other side effects that you think are caused by this medicine, call your doctor immediately.

## 2022-07-22 NOTE — OP NOTE
Procedure Date: 07/13/2022    PREOPERATIVE DIAGNOSIS:  Painful retained hardware, right tibia.    POSTOPERATIVE DIAGNOSIS:  Painful retained hardware, right tibia.    PROCEDURE:  Right tibia hardware removal.    SURGEON:  Ronnie Castle MD    ASSISTANT:  Suzy Davalos PA-C   Ms. Davalos's assistance was required in order to provide assistance with positioning, the surgery itself, holding retractors, closure of the wound and application of immobilization devices.  At the time of the surgery, there was no available help from an orthopedic trainee.    COMPLICATIONS:  None.    DRAINS:  None.    ESTIMATED BLOOD LOSS:  Less than 20 mL.    ANESTHESIA:  General endotracheal.    INDICATIONS FOR PROCEDURE:  Please refer to clinic note for further details discussing indications of Mrs. Lobato's case.    DESCRIPTION OF PROCEDURE:  On 07/13/2022, the patient was taken to surgery.  Preoperative antibiotics were administered to the patient prior to arrival to the OR.    After successful induction of general endotracheal anesthesia, she was placed supine on the operating table on the left thigh.  The right lower extremity was prepped and draped in sterile fashion.  After exsanguination by gravity, tourniquet cuff was inflated to 300 mmHg on the proximal third of the right thigh.    The pause for the cause was performed according to institution's policy, which confirmed laterality of the procedure.    An incision was made along the medial aspect of the tibia both distally and proximally.  We proceeded with identification of both distal locking screws after dissection of subcutaneous tissues.  Both the screws be removed in 1 piece without any difficulties.    Tourniquet was deflated.  Satisfactory hemostasis was accomplished.  Wounds were closed in layers and sterile dressings were applied.  The patient was placed in a tall Cam walker and transferred in stable condition to PACU.    PLAN:  The patient will remain weightbearing as  tolerated.  The patient will proceed with the use of the CAM Walker for comfort purposes.  The patient will return to clinic at 2 weeks for suture removal and she will proceed with activities without any restrictions.  The patient is not expected to require any physical therapy specifically because of the procedure.  However, if that is her desire, that will be performed without any restrictions.    The patient will be granted the possibility of skipping the 6-week appointment, assuming that the leg is working up to her expectations.  In the eventuality of her returning to the 6-week appointment, no x-rays will be obtained.    Ronnie Castle MD        D: 2022   T: 2022   MT: [id number]    Name:     NORRIS ALANIS  MRN:      [id number]        Account:        [id number]   :      1990           Procedure Date: 2022     Document: [id number]

## 2022-08-01 ENCOUNTER — OFFICE VISIT (OUTPATIENT)
Dept: ORTHOPEDICS | Facility: CLINIC | Age: 32
End: 2022-08-01
Payer: OTHER GOVERNMENT

## 2022-08-01 DIAGNOSIS — Z98.890 STATUS POST SURGERY: Primary | ICD-10-CM

## 2022-08-01 PROCEDURE — 99207 PR NO CHARGE NURSE ONLY: CPT

## 2022-08-01 NOTE — PROGRESS NOTES
Reason for visit:    Suzy Lobato came in to the clinic for a two week post op check.    Her surgery was done 7/13/22 by Dr Castle.  She had a Right lower leg hardware removal     Assessment:    Suzy came into the clinic in normal shoes WBAT.    The Surgical wounds were exposed and found to be well-healed; so the sutures were removed. Skin was c/d/i. Steri strips were applied. Patient reports little to no pain.    Plan:     She was told to continue to WBAT. She has no further restrictions     She has an appointment to see Dr. Castle at 6 weeks post op and at that time Dr. Castle will determine further restrictions. She may however, skip this appointment if she is doing well.    She has our phone number and will call with questions or problems.      Bess Keita ATC

## 2022-09-25 ENCOUNTER — HEALTH MAINTENANCE LETTER (OUTPATIENT)
Age: 32
End: 2022-09-25

## 2023-06-04 ENCOUNTER — HEALTH MAINTENANCE LETTER (OUTPATIENT)
Age: 33
End: 2023-06-04

## 2023-09-05 ENCOUNTER — OFFICE VISIT (OUTPATIENT)
Dept: ORTHOPEDICS | Facility: CLINIC | Age: 33
End: 2023-09-05
Payer: OTHER GOVERNMENT

## 2023-09-05 DIAGNOSIS — M54.16 LUMBAR RADICULOPATHY: Primary | ICD-10-CM

## 2023-09-05 PROCEDURE — 99024 POSTOP FOLLOW-UP VISIT: CPT | Performed by: ORTHOPAEDIC SURGERY

## 2023-09-05 NOTE — NURSING NOTE
Reason For Visit:   Chief Complaint   Patient presents with    RECHECK     Right leg severe calf tightness, cannot get it loose no matter what. Developed shin splints. Lots of pain. Any recommendations?       There were no vitals taken for this visit.         Bess Keita, ATC

## 2023-09-05 NOTE — LETTER
9/5/2023         RE: Suzy Lobato  [address]  [place] [address]        Dear Colleague,    Thank you for referring your patient, Suzy Lobato, to the [facility] [place]. Please see a copy of my visit note below.    Chief complaint: Status post right lower leg hardware removal performed on July 13, 2022     Patient presents today for further follow-up.  Reports to still having problems and difficulties.  Reports that overall she has developed some shin splints which she is now treating conservatively but she also reports to have an the sensation of burning across the calf.  This is quite difficult for her.  She seems not to be able to get out of bed with physical therapy and reports to have difficulties with the stretching.    In today's exam she presents with a positive straight leg test.  Otherwise exam is fairly unremarkable    Assessment: Status post right lower leg hardware removal with possible right lumbar radiculopathy    Plan: I discussed with the patient that at this point I would like to proceed with an MRI of the lumbar spine to see if we can identify any pinched nerves as I suspect that she presents with a lumbar radiculopathy which is giving her the burning sensation across the lower leg    Patient otherwise has no activity restrictions.  She will be contacted via phone as soon as the MRI results are available for review    All questions were answered.        Ronnie Castle MD

## 2023-09-06 NOTE — PROGRESS NOTES
Chief complaint: Status post right lower leg hardware removal performed on July 13, 2022     Patient presents today for further follow-up.  Reports to still having problems and difficulties.  Reports that overall she has developed some shin splints which she is now treating conservatively but she also reports to have an the sensation of burning across the calf.  This is quite difficult for her.  She seems not to be able to get out of bed with physical therapy and reports to have difficulties with the stretching.    In today's exam she presents with a positive straight leg test.  Otherwise exam is fairly unremarkable    Assessment: Status post right lower leg hardware removal with possible right lumbar radiculopathy    Plan: I discussed with the patient that at this point I would like to proceed with an MRI of the lumbar spine to see if we can identify any pinched nerves as I suspect that she presents with a lumbar radiculopathy which is giving her the burning sensation across the lower leg    Patient otherwise has no activity restrictions.  She will be contacted via phone as soon as the MRI results are available for review    All questions were answered.

## 2024-07-20 ENCOUNTER — HEALTH MAINTENANCE LETTER (OUTPATIENT)
Age: 34
End: 2024-07-20

## 2025-08-09 ENCOUNTER — HEALTH MAINTENANCE LETTER (OUTPATIENT)
Age: 35
End: 2025-08-09

## (undated) DEVICE — GLOVE PROTEXIS W/NEU-THERA 7.5  2D73TE75

## (undated) DEVICE — LINEN GOWN XLG 5407

## (undated) DEVICE — SUCTION MANIFOLD NEPTUNE 2 SYS 4 PORT 0702-020-000

## (undated) DEVICE — PACK SET-UP STD 9102

## (undated) DEVICE — DRSG GAUZE 4X8" NON21842

## (undated) DEVICE — GLOVE PROTEXIS BLUE W/NEU-THERA 8.0  2D73EB80

## (undated) DEVICE — NDL 22GA 1.5"

## (undated) DEVICE — SU VICRYL 2-0 CT-2 27" UND J269H

## (undated) DEVICE — DRSG STERI STRIP 1/2X4" R1547

## (undated) DEVICE — DRAPE C-ARMOR 5 SIDED 5523

## (undated) DEVICE — SU ETHILON 3-0 PS-1 18" 1663H

## (undated) DEVICE — GLOVE PROTEXIS MICRO 6.5  2D73PM65

## (undated) DEVICE — BNDG ELASTIC 4"X5YDS UNSTERILE 6611-40

## (undated) DEVICE — LINEN ORTHO PACK 5446

## (undated) DEVICE — Device

## (undated) DEVICE — LINEN TOWEL PACK X5 5464

## (undated) DEVICE — GOWN XLG DISP 9545

## (undated) DEVICE — SU VICRYL 0 CT 36" J358H

## (undated) DEVICE — BLADE SAW SAGITTAL STRK 13X90X0.89MM HD SYS 6 6113-089-090

## (undated) DEVICE — GLOVE PROTEXIS POWDER FREE SMT 7.5  2D72PT75X

## (undated) DEVICE — ESU GROUND PAD ADULT W/CORD E7507

## (undated) DEVICE — DRAPE C-ARM W/STRAPS 42X72" 07-CA104

## (undated) DEVICE — PREP CHLORAPREP 26ML TINTED HI-LITE ORANGE 930815

## (undated) DEVICE — SPONGE LAP 18X18" X8435

## (undated) DEVICE — IMM LEG ELEVATOR 79-90191

## (undated) DEVICE — SU MONOCRYL 3-0 PS-2 18" UND Y497G

## (undated) DEVICE — BLADE KNIFE SURG 10 371110

## (undated) DEVICE — GLOVE PROTEXIS BLUE W/NEU-THERA 7.0  2D73EB70

## (undated) DEVICE — PACK LOWER EXTREMITY RIVERSIDE SOP32LEFSX

## (undated) DEVICE — 4.3 DRILL BIT

## (undated) DEVICE — PACK LOWER EXTREMITY CUSTOM ASC

## (undated) DEVICE — SOL NACL 0.9% IRRIG 500ML BOTTLE 2F7123

## (undated) DEVICE — TOURNIQUET CUFF 34" REPRO BROWN 60-7070-106

## (undated) DEVICE — PREP CHLORAPREP 26ML TINTED ORANGE  260815

## (undated) DEVICE — CAST PADDING 6" UNSTERILE 9046

## (undated) DEVICE — SOL NACL 0.9% IRRIG 1000ML BOTTLE 2F7124

## (undated) DEVICE — DRSG ABDOMINAL 07 1/2X8" 7197D

## (undated) DEVICE — STRAP KNEE/BODY 31143004

## (undated) DEVICE — BNDG ELASTIC 6" DBL LENGTH UNSTERILE 6611-16

## (undated) DEVICE — SUCTION MANIFOLD NEPTUNE 2 SYS 1 PORT 702-025-000

## (undated) RX ORDER — ONDANSETRON 2 MG/ML
INJECTION INTRAMUSCULAR; INTRAVENOUS
Status: DISPENSED
Start: 2021-05-26

## (undated) RX ORDER — FENTANYL CITRATE 50 UG/ML
INJECTION, SOLUTION INTRAMUSCULAR; INTRAVENOUS
Status: DISPENSED
Start: 2021-05-26

## (undated) RX ORDER — BUPIVACAINE HYDROCHLORIDE 5 MG/ML
INJECTION, SOLUTION PERINEURAL
Status: DISPENSED
Start: 2021-05-26

## (undated) RX ORDER — ACETAMINOPHEN 325 MG/1
TABLET ORAL
Status: DISPENSED
Start: 2021-05-26

## (undated) RX ORDER — SCOLOPAMINE TRANSDERMAL SYSTEM 1 MG/1
PATCH, EXTENDED RELEASE TRANSDERMAL
Status: DISPENSED
Start: 2021-05-26

## (undated) RX ORDER — PROPOFOL 10 MG/ML
INJECTION, EMULSION INTRAVENOUS
Status: DISPENSED
Start: 2022-07-13

## (undated) RX ORDER — CEFAZOLIN SODIUM IN 0.9 % NACL 3 G/100 ML
INTRAVENOUS SOLUTION, PIGGYBACK (ML) INTRAVENOUS
Status: DISPENSED
Start: 2021-05-26

## (undated) RX ORDER — CEFAZOLIN SODIUM 1 G/3ML
INJECTION, POWDER, FOR SOLUTION INTRAMUSCULAR; INTRAVENOUS
Status: DISPENSED
Start: 2022-07-13

## (undated) RX ORDER — FENTANYL CITRATE 50 UG/ML
INJECTION, SOLUTION INTRAMUSCULAR; INTRAVENOUS
Status: DISPENSED
Start: 2022-07-13

## (undated) RX ORDER — HYDROMORPHONE HYDROCHLORIDE 1 MG/ML
INJECTION, SOLUTION INTRAMUSCULAR; INTRAVENOUS; SUBCUTANEOUS
Status: DISPENSED
Start: 2021-05-26

## (undated) RX ORDER — GABAPENTIN 300 MG/1
CAPSULE ORAL
Status: DISPENSED
Start: 2021-05-26

## (undated) RX ORDER — LIDOCAINE HYDROCHLORIDE 20 MG/ML
INJECTION, SOLUTION EPIDURAL; INFILTRATION; INTRACAUDAL; PERINEURAL
Status: DISPENSED
Start: 2022-07-13

## (undated) RX ORDER — PROPOFOL 10 MG/ML
INJECTION, EMULSION INTRAVENOUS
Status: DISPENSED
Start: 2021-05-26

## (undated) RX ORDER — SCOLOPAMINE TRANSDERMAL SYSTEM 1 MG/1
PATCH, EXTENDED RELEASE TRANSDERMAL
Status: DISPENSED
Start: 2022-07-13

## (undated) RX ORDER — SODIUM CHLORIDE, SODIUM LACTATE, POTASSIUM CHLORIDE, CALCIUM CHLORIDE 600; 310; 30; 20 MG/100ML; MG/100ML; MG/100ML; MG/100ML
INJECTION, SOLUTION INTRAVENOUS
Status: DISPENSED
Start: 2021-05-26

## (undated) RX ORDER — OXYCODONE HYDROCHLORIDE 5 MG/1
TABLET ORAL
Status: DISPENSED
Start: 2021-05-26

## (undated) RX ORDER — KETOROLAC TROMETHAMINE 30 MG/ML
INJECTION, SOLUTION INTRAMUSCULAR; INTRAVENOUS
Status: DISPENSED
Start: 2022-07-13

## (undated) RX ORDER — OXYCODONE HYDROCHLORIDE 5 MG/1
TABLET ORAL
Status: DISPENSED
Start: 2022-07-13

## (undated) RX ORDER — GLYCOPYRROLATE 0.2 MG/ML
INJECTION INTRAMUSCULAR; INTRAVENOUS
Status: DISPENSED
Start: 2022-07-13

## (undated) RX ORDER — ACETAMINOPHEN 325 MG/1
TABLET ORAL
Status: DISPENSED
Start: 2022-07-13

## (undated) RX ORDER — ONDANSETRON 2 MG/ML
INJECTION INTRAMUSCULAR; INTRAVENOUS
Status: DISPENSED
Start: 2022-07-13

## (undated) RX ORDER — KETOROLAC TROMETHAMINE 30 MG/ML
INJECTION, SOLUTION INTRAMUSCULAR; INTRAVENOUS
Status: DISPENSED
Start: 2021-05-26

## (undated) RX ORDER — HYDROMORPHONE HYDROCHLORIDE 1 MG/ML
INJECTION, SOLUTION INTRAMUSCULAR; INTRAVENOUS; SUBCUTANEOUS
Status: DISPENSED
Start: 2022-07-13

## (undated) RX ORDER — DEXAMETHASONE SODIUM PHOSPHATE 4 MG/ML
INJECTION, SOLUTION INTRA-ARTICULAR; INTRALESIONAL; INTRAMUSCULAR; INTRAVENOUS; SOFT TISSUE
Status: DISPENSED
Start: 2022-07-13